# Patient Record
Sex: FEMALE | Race: WHITE | NOT HISPANIC OR LATINO | Employment: UNEMPLOYED | URBAN - METROPOLITAN AREA
[De-identification: names, ages, dates, MRNs, and addresses within clinical notes are randomized per-mention and may not be internally consistent; named-entity substitution may affect disease eponyms.]

---

## 2019-10-15 PROBLEM — E66.811 OBESITY (BMI 30.0-34.9): Status: ACTIVE | Noted: 2019-10-15

## 2020-03-03 PROBLEM — E66.812 CLASS 2 OBESITY DUE TO EXCESS CALORIES WITHOUT SERIOUS COMORBIDITY WITH BODY MASS INDEX (BMI) OF 39.0 TO 39.9 IN ADULT: Status: ACTIVE | Noted: 2019-10-15

## 2021-08-06 DIAGNOSIS — F40.01 PANIC DISORDER WITH AGORAPHOBIA: ICD-10-CM

## 2021-08-06 RX ORDER — LORAZEPAM 0.5 MG/1
0.5 TABLET ORAL EVERY 6 HOURS PRN
Qty: 120 TABLET | Refills: 0 | Status: SHIPPED | OUTPATIENT
Start: 2021-08-06 | End: 2021-09-20 | Stop reason: SDUPTHER

## 2021-08-06 NOTE — TELEPHONE ENCOUNTER
Requested medication(s) are due for refill today: Yes  Patient has already received a courtesy refill: No  Other reason request has been forwarded to provider:

## 2022-05-21 DIAGNOSIS — R00.0 TACHYARRHYTHMIA: ICD-10-CM

## 2022-05-21 DIAGNOSIS — F40.01 PANIC DISORDER WITH AGORAPHOBIA: ICD-10-CM

## 2022-05-21 RX ORDER — LORAZEPAM 0.5 MG/1
0.5 TABLET ORAL EVERY 6 HOURS PRN
Qty: 120 TABLET | Refills: 0 | Status: SHIPPED | OUTPATIENT
Start: 2022-05-21 | End: 2022-06-27 | Stop reason: SDUPTHER

## 2022-05-21 RX ORDER — PROPRANOLOL HYDROCHLORIDE 10 MG/1
20 TABLET ORAL 4 TIMES DAILY
Qty: 240 TABLET | Refills: 1 | Status: SHIPPED | OUTPATIENT
Start: 2022-05-21 | End: 2022-06-15

## 2022-11-25 DIAGNOSIS — F40.01 PANIC DISORDER WITH AGORAPHOBIA: ICD-10-CM

## 2022-11-27 RX ORDER — LORAZEPAM 0.5 MG/1
0.5 TABLET ORAL EVERY 6 HOURS PRN
Qty: 120 TABLET | Refills: 1 | Status: SHIPPED | OUTPATIENT
Start: 2022-11-27

## 2022-12-02 ENCOUNTER — OFFICE VISIT (OUTPATIENT)
Dept: FAMILY MEDICINE CLINIC | Facility: HOSPITAL | Age: 47
End: 2022-12-02

## 2022-12-02 VITALS — HEIGHT: 69 IN | BODY MASS INDEX: 45.37 KG/M2

## 2022-12-02 DIAGNOSIS — C73 PAPILLARY MICROCARCINOMA OF THYROID (HCC): ICD-10-CM

## 2022-12-02 DIAGNOSIS — F42.2 MIXED OBSESSIONAL THOUGHTS AND ACTS: ICD-10-CM

## 2022-12-02 DIAGNOSIS — R60.1 GENERALIZED EDEMA: ICD-10-CM

## 2022-12-02 DIAGNOSIS — E89.0 HYPOTHYROIDISM, POSTOP: ICD-10-CM

## 2022-12-02 DIAGNOSIS — R00.0 TACHYARRHYTHMIA: ICD-10-CM

## 2022-12-02 DIAGNOSIS — F43.22 ADJUSTMENT DISORDER WITH ANXIETY: Primary | ICD-10-CM

## 2022-12-02 DIAGNOSIS — E78.1 HYPERTRIGLYCERIDEMIA: ICD-10-CM

## 2022-12-02 RX ORDER — PAROXETINE 10 MG/1
10 TABLET, FILM COATED ORAL DAILY
Qty: 30 TABLET | Refills: 1 | Status: SHIPPED | OUTPATIENT
Start: 2022-12-02 | End: 2022-12-17

## 2022-12-02 RX ORDER — BUSPIRONE HYDROCHLORIDE 5 MG/1
5 TABLET ORAL 2 TIMES DAILY
Qty: 60 TABLET | Refills: 5 | Status: SHIPPED | OUTPATIENT
Start: 2022-12-02

## 2022-12-02 NOTE — PROGRESS NOTES
Name: Joseph Hernández      : 1975      MRN: [id number]  Encounter Provider: Herlinda Leslie MD  Encounter Date: 2022   Encounter department: [facility]      1  Adjustment disorder with anxiety  -     busPIRone (BUSPAR) 5 mg tablet; Take 1 tablet (5 mg total) by mouth 2 (two) times a day    2  Mixed obsessional thoughts and acts  -     PARoxetine (PAXIL) 10 mg tablet; Take 1 tablet (10 mg total) by mouth daily    3  Hypothyroidism, postop    4  Papillary microcarcinoma of thyroid ([address] )    5  Tachyarrhythmia    6  Generalized edema    7  Hypertriglyceridemia           Subjective       here to discuss medication and anxiety  Pt unable to be present for visit due to severe agoraphobia    Taking Lorazepam in am, and 2 hs for sleep  Also takes Clonazepam in afternoon    Has some days that are reasonably good but today is not a good day    Has exercise machine using every day that does arm action and ski action    Review of Systems    Current Outpatient Medications on File Prior to Visit   Medication Sig   • clonazePAM (KlonoPIN) 1 mg tablet Take 1 tablet (1 mg total) by mouth daily at bedtime   • LORazepam (ATIVAN) 0 5 mg tablet Take 1 tablet (0 5 mg total) by mouth every 6 (six) hours as needed for anxiety   • propranolol (INDERAL) 10 mg tablet TAKE 2 TABS BY MOUTH IN THE MORNING, 2 TABS AT NOON, 2 TABS IN THE EVENING, 2 TABS BEFORE BEDTIME     • Synthroid 100 MCG tablet TAKE 1 TABLET BY MOUTH EVERY DAY   • butalbital-acetaminophen-caffeine (FIORICET,ESGIC) -40 mg per tablet Take 1 tablet by mouth every 4 (four) hours as needed for headaches (Patient not taking: Reported on 2022)   • clomiPRAMINE (ANAFRANIL) 25 mg capsule Take 1 capsule (25 mg total) by mouth daily at bedtime (Patient not taking: Reported on 2022)   • famotidine (PEPCID) 10 mg tablet Take 10 mg by mouth 2 (two) times a day (Patient not taking: Reported on "12/2/2022)   • FLUoxetine (PROzac) 40 MG capsule TAKE 1 CAPSULE BY MOUTH EVERY DAY (Patient not taking: Reported on 12/2/2022)   • furosemide (LASIX) 20 mg tablet Take 1 tablet (20 mg total) by mouth daily (Patient not taking: Reported on 12/2/2022)       Objective     Ht 5' 9\" (1 753 m)   BMI 45 37 kg/m²     Physical Exam  Tate Xavier MD  "

## 2022-12-05 PROBLEM — F40.01 PANIC DISORDER WITH AGORAPHOBIA: Status: ACTIVE | Noted: 2022-12-05

## 2022-12-16 DIAGNOSIS — F43.22 ADJUSTMENT DISORDER WITH ANXIETY: ICD-10-CM

## 2022-12-16 RX ORDER — CLONAZEPAM 1 MG/1
1 TABLET ORAL
Qty: 30 TABLET | Refills: 0 | Status: SHIPPED | OUTPATIENT
Start: 2022-12-16 | End: 2023-01-16 | Stop reason: SDUPTHER

## 2022-12-17 DIAGNOSIS — F42.2 MIXED OBSESSIONAL THOUGHTS AND ACTS: ICD-10-CM

## 2022-12-17 RX ORDER — PAROXETINE 10 MG/1
TABLET, FILM COATED ORAL
Qty: 90 TABLET | Refills: 1 | Status: SHIPPED | OUTPATIENT
Start: 2022-12-17

## 2022-12-19 DIAGNOSIS — R00.0 TACHYARRHYTHMIA: ICD-10-CM

## 2022-12-19 RX ORDER — PROPRANOLOL HYDROCHLORIDE 10 MG/1
TABLET ORAL
Qty: 720 TABLET | Refills: 1 | Status: SHIPPED | OUTPATIENT
Start: 2022-12-19

## 2022-12-20 ENCOUNTER — TELEPHONE (OUTPATIENT)
Dept: FAMILY MEDICINE CLINIC | Facility: HOSPITAL | Age: 47
End: 2022-12-20

## 2022-12-27 DIAGNOSIS — F40.01 PANIC DISORDER WITH AGORAPHOBIA: ICD-10-CM

## 2022-12-27 RX ORDER — LORAZEPAM 0.5 MG/1
0.5 TABLET ORAL EVERY 6 HOURS PRN
Qty: 120 TABLET | Refills: 1 | Status: SHIPPED | OUTPATIENT
Start: 2022-12-27

## 2023-01-06 DIAGNOSIS — E89.0 HYPOTHYROIDISM, POSTOP: ICD-10-CM

## 2023-01-06 RX ORDER — LEVOTHYROXINE SODIUM 100 MCG
TABLET ORAL
Qty: 90 TABLET | Refills: 3 | Status: SHIPPED | OUTPATIENT
Start: 2023-01-06

## 2023-01-16 DIAGNOSIS — F43.22 ADJUSTMENT DISORDER WITH ANXIETY: ICD-10-CM

## 2023-01-17 RX ORDER — CLONAZEPAM 1 MG/1
1 TABLET ORAL
Qty: 30 TABLET | Refills: 0 | Status: SHIPPED | OUTPATIENT
Start: 2023-01-17

## 2023-01-20 ENCOUNTER — TELEPHONE (OUTPATIENT)
Dept: FAMILY MEDICINE CLINIC | Facility: HOSPITAL | Age: 48
End: 2023-01-20

## 2023-01-20 NOTE — TELEPHONE ENCOUNTER
Patient's  called, he dropped some of her Lorazepam and they were destroyed  She is completely out of medication and her  is asking if anything can be done  Pharmacist said it cannot be refilled until 1/25/23  I did reach out to Ibrahima Noriega who is on call, but she said office policy is that controlled substances are not refilled by on call provider over the weekend   notified

## 2023-01-23 ENCOUNTER — TELEPHONE (OUTPATIENT)
Dept: OTHER | Facility: OTHER | Age: 48
End: 2023-01-23

## 2023-01-23 DIAGNOSIS — F40.01 PANIC DISORDER WITH AGORAPHOBIA: ICD-10-CM

## 2023-01-23 RX ORDER — LORAZEPAM 0.5 MG/1
0.5 TABLET ORAL EVERY 6 HOURS PRN
Qty: 120 TABLET | Refills: 1 | Status: SHIPPED | OUTPATIENT
Start: 2023-01-23 | End: 2023-01-24 | Stop reason: SDUPTHER

## 2023-01-23 NOTE — TELEPHONE ENCOUNTER
Dylan Carlton calling again  Pills were accidentally destroyed  She has not had lorazepam for 4 days and is starting to have withdrawal sx  New script for lorazepam sent to you with message to pharm that it is ok to fill early  Pls let loli know if/when this goes to the pharm

## 2023-01-23 NOTE — TELEPHONE ENCOUNTER
called stating he dropped a bunch of her pills in the sink and they got wet  She is now low on meds and needs more sent in  She has a refill on med but pharm will not fill til 1/25/23  Can you approve an early fill?

## 2023-01-24 DIAGNOSIS — F40.01 PANIC DISORDER WITH AGORAPHOBIA: ICD-10-CM

## 2023-01-24 RX ORDER — LORAZEPAM 0.5 MG/1
0.5 TABLET ORAL EVERY 6 HOURS PRN
Qty: 120 TABLET | Refills: 1 | Status: SHIPPED | OUTPATIENT
Start: 2023-01-24

## 2023-02-17 ENCOUNTER — TELEPHONE (OUTPATIENT)
Dept: FAMILY MEDICINE CLINIC | Facility: HOSPITAL | Age: 48
End: 2023-02-17

## 2023-02-17 NOTE — TELEPHONE ENCOUNTER
Sylvester Yu called in for PT - she is helping him with the settlement of his father's estate - and is having a lot of anxiety with this -took a few extra lorazepam - she only has 2 pills left for the month and they can't get it refilled yet  He's asking for an extra 20 pills to hold her over until it time to refill the regular script  Any questions you can call Sylvester Yu

## 2023-02-18 DIAGNOSIS — F40.01 PANIC DISORDER WITH AGORAPHOBIA: ICD-10-CM

## 2023-02-18 RX ORDER — LORAZEPAM 0.5 MG/1
TABLET ORAL
Qty: 150 TABLET | Refills: 0 | Status: SHIPPED | OUTPATIENT
Start: 2023-02-18 | End: 2023-02-21 | Stop reason: SDUPTHER

## 2023-02-20 DIAGNOSIS — F43.22 ADJUSTMENT DISORDER WITH ANXIETY: ICD-10-CM

## 2023-02-20 RX ORDER — CLONAZEPAM 1 MG/1
1 TABLET ORAL
Qty: 30 TABLET | Refills: 0 | Status: SHIPPED | OUTPATIENT
Start: 2023-02-20

## 2023-02-21 ENCOUNTER — TELEPHONE (OUTPATIENT)
Dept: FAMILY MEDICINE CLINIC | Facility: HOSPITAL | Age: 48
End: 2023-02-21

## 2023-02-21 DIAGNOSIS — F42.2 MIXED OBSESSIONAL THOUGHTS AND ACTS: ICD-10-CM

## 2023-02-21 DIAGNOSIS — F40.01 PANIC DISORDER WITH AGORAPHOBIA: ICD-10-CM

## 2023-02-21 RX ORDER — LORAZEPAM 0.5 MG/1
TABLET ORAL
Qty: 120 TABLET | Refills: 0 | Status: SHIPPED | OUTPATIENT
Start: 2023-02-21

## 2023-03-01 DIAGNOSIS — E89.0 HYPOTHYROIDISM, POSTOP: ICD-10-CM

## 2023-03-01 RX ORDER — LEVOTHYROXINE SODIUM 100 MCG
100 TABLET ORAL DAILY
Qty: 90 TABLET | Refills: 0 | Status: SHIPPED | OUTPATIENT
Start: 2023-03-01

## 2023-03-14 DIAGNOSIS — R00.0 TACHYARRHYTHMIA: ICD-10-CM

## 2023-03-16 RX ORDER — PROPRANOLOL HYDROCHLORIDE 10 MG/1
20 TABLET ORAL 4 TIMES DAILY
Qty: 720 TABLET | Refills: 0 | Status: SHIPPED | OUTPATIENT
Start: 2023-03-16

## 2023-03-27 DIAGNOSIS — F43.22 ADJUSTMENT DISORDER WITH ANXIETY: ICD-10-CM

## 2023-03-27 RX ORDER — CLONAZEPAM 1 MG/1
1 TABLET ORAL
Qty: 30 TABLET | Refills: 0 | Status: SHIPPED | OUTPATIENT
Start: 2023-03-27

## 2023-03-29 DIAGNOSIS — E89.0 HYPOTHYROIDISM, POSTOP: ICD-10-CM

## 2023-03-30 RX ORDER — LEVOTHYROXINE SODIUM 100 MCG
TABLET ORAL
Qty: 90 TABLET | Refills: 1 | Status: SHIPPED | OUTPATIENT
Start: 2023-03-30

## 2023-04-24 DIAGNOSIS — F43.22 ADJUSTMENT DISORDER WITH ANXIETY: ICD-10-CM

## 2023-04-24 RX ORDER — CLONAZEPAM 1 MG/1
1 TABLET ORAL
Qty: 30 TABLET | Refills: 0 | Status: SHIPPED | OUTPATIENT
Start: 2023-04-24

## 2023-05-24 DIAGNOSIS — F43.22 ADJUSTMENT DISORDER WITH ANXIETY: ICD-10-CM

## 2023-05-24 RX ORDER — CLONAZEPAM 1 MG/1
1 TABLET ORAL
Qty: 30 TABLET | Refills: 0 | Status: SHIPPED | OUTPATIENT
Start: 2023-05-24

## 2023-06-12 DIAGNOSIS — R00.0 TACHYARRHYTHMIA: ICD-10-CM

## 2023-06-12 RX ORDER — PROPRANOLOL HYDROCHLORIDE 10 MG/1
20 TABLET ORAL 4 TIMES DAILY
Qty: 720 TABLET | Refills: 0 | Status: SHIPPED | OUTPATIENT
Start: 2023-06-12

## 2023-06-27 DIAGNOSIS — F43.22 ADJUSTMENT DISORDER WITH ANXIETY: ICD-10-CM

## 2023-06-27 RX ORDER — CLONAZEPAM 1 MG/1
1 TABLET ORAL
Qty: 30 TABLET | Refills: 0 | Status: SHIPPED | OUTPATIENT
Start: 2023-06-27

## 2023-07-25 DIAGNOSIS — R79.9 ABNORMAL BLOOD CHEMISTRY: ICD-10-CM

## 2023-07-25 DIAGNOSIS — E78.1 HYPERTRIGLYCERIDEMIA: Primary | ICD-10-CM

## 2023-07-25 DIAGNOSIS — E89.0 HYPOTHYROIDISM, POSTOP: ICD-10-CM

## 2023-07-27 DIAGNOSIS — F43.22 ADJUSTMENT DISORDER WITH ANXIETY: ICD-10-CM

## 2023-07-27 RX ORDER — CLONAZEPAM 1 MG/1
1 TABLET ORAL
Qty: 30 TABLET | Refills: 0 | Status: SHIPPED | OUTPATIENT
Start: 2023-07-27

## 2023-08-25 DIAGNOSIS — F43.22 ADJUSTMENT DISORDER WITH ANXIETY: ICD-10-CM

## 2023-08-25 RX ORDER — CLONAZEPAM 1 MG/1
1 TABLET ORAL
Qty: 30 TABLET | Refills: 0 | Status: SHIPPED | OUTPATIENT
Start: 2023-08-25

## 2023-08-29 DIAGNOSIS — R00.0 TACHYARRHYTHMIA: ICD-10-CM

## 2023-08-29 RX ORDER — PROPRANOLOL HYDROCHLORIDE 10 MG/1
20 TABLET ORAL 4 TIMES DAILY
Qty: 720 TABLET | Refills: 0 | Status: SHIPPED | OUTPATIENT
Start: 2023-08-29

## 2023-08-29 NOTE — TELEPHONE ENCOUNTER
Requested medication(s) are due for refill today: Yes  Patient has already received a courtesy refill: No  Other reason request has been forwarded to provider: please advise, pt hasn't been seen in over a year.

## 2023-09-25 DIAGNOSIS — F43.22 ADJUSTMENT DISORDER WITH ANXIETY: ICD-10-CM

## 2023-09-25 DIAGNOSIS — E89.0 HYPOTHYROIDISM, POSTOP: ICD-10-CM

## 2023-09-25 RX ORDER — LEVOTHYROXINE SODIUM 100 MCG
TABLET ORAL
Qty: 30 TABLET | Refills: 5 | Status: SHIPPED | OUTPATIENT
Start: 2023-09-25

## 2023-09-25 RX ORDER — CLONAZEPAM 1 MG/1
1 TABLET ORAL
Qty: 30 TABLET | Refills: 0 | Status: SHIPPED | OUTPATIENT
Start: 2023-09-25

## 2023-10-15 DIAGNOSIS — E89.0 HYPOTHYROIDISM, POSTOP: ICD-10-CM

## 2023-10-16 RX ORDER — LEVOTHYROXINE SODIUM 100 MCG
100 TABLET ORAL DAILY
Qty: 30 TABLET | Refills: 3 | Status: SHIPPED | OUTPATIENT
Start: 2023-10-16

## 2023-10-26 DIAGNOSIS — F43.22 ADJUSTMENT DISORDER WITH ANXIETY: ICD-10-CM

## 2023-10-26 RX ORDER — CLONAZEPAM 1 MG/1
1 TABLET ORAL
Qty: 30 TABLET | Refills: 0 | Status: SHIPPED | OUTPATIENT
Start: 2023-10-26

## 2023-11-09 DIAGNOSIS — R00.0 TACHYARRHYTHMIA: ICD-10-CM

## 2023-11-09 DIAGNOSIS — E89.0 HYPOTHYROIDISM, POSTOP: ICD-10-CM

## 2023-11-09 RX ORDER — PROPRANOLOL HYDROCHLORIDE 10 MG/1
20 TABLET ORAL 4 TIMES DAILY
Qty: 720 TABLET | Refills: 0 | Status: SHIPPED | OUTPATIENT
Start: 2023-11-09 | End: 2023-11-09 | Stop reason: SDUPTHER

## 2023-11-09 RX ORDER — PROPRANOLOL HYDROCHLORIDE 10 MG/1
20 TABLET ORAL 4 TIMES DAILY
Qty: 128 TABLET | Refills: 0 | Status: SHIPPED | OUTPATIENT
Start: 2023-11-09

## 2023-11-09 RX ORDER — LEVOTHYROXINE SODIUM 100 MCG
100 TABLET ORAL DAILY
Qty: 30 TABLET | Refills: 0 | Status: SHIPPED | OUTPATIENT
Start: 2023-11-09

## 2023-11-24 DIAGNOSIS — R00.0 TACHYARRHYTHMIA: ICD-10-CM

## 2023-11-24 DIAGNOSIS — F43.22 ADJUSTMENT DISORDER WITH ANXIETY: ICD-10-CM

## 2023-11-24 RX ORDER — PROPRANOLOL HYDROCHLORIDE 10 MG/1
20 TABLET ORAL 4 TIMES DAILY
Qty: 128 TABLET | Refills: 0 | Status: SHIPPED | OUTPATIENT
Start: 2023-11-24

## 2023-11-24 RX ORDER — CLONAZEPAM 1 MG/1
1 TABLET ORAL
Qty: 30 TABLET | Refills: 0 | Status: SHIPPED | OUTPATIENT
Start: 2023-11-24

## 2023-12-14 DIAGNOSIS — R00.0 TACHYARRHYTHMIA: ICD-10-CM

## 2023-12-15 RX ORDER — PROPRANOLOL HYDROCHLORIDE 10 MG/1
20 TABLET ORAL 4 TIMES DAILY
Qty: 128 TABLET | Refills: 3 | Status: SHIPPED | OUTPATIENT
Start: 2023-12-15

## 2023-12-22 ENCOUNTER — TELEPHONE (OUTPATIENT)
Dept: FAMILY MEDICINE CLINIC | Facility: HOSPITAL | Age: 48
End: 2023-12-22

## 2023-12-22 DIAGNOSIS — E89.0 HYPOTHYROIDISM, POSTOP: ICD-10-CM

## 2023-12-22 RX ORDER — LEVOTHYROXINE SODIUM 100 MCG
100 TABLET ORAL DAILY
Qty: 30 TABLET | Refills: 3 | Status: SHIPPED | OUTPATIENT
Start: 2023-12-22

## 2023-12-22 NOTE — TELEPHONE ENCOUNTER
Pt's spouse reports that rx for Propanolol was sent for only 128 tabs. States she will run out before the month and should be double. Asking if this can be resent. PCB spouse with update.

## 2023-12-26 DIAGNOSIS — R00.0 TACHYARRHYTHMIA: ICD-10-CM

## 2023-12-26 DIAGNOSIS — F43.22 ADJUSTMENT DISORDER WITH ANXIETY: ICD-10-CM

## 2023-12-26 RX ORDER — CLONAZEPAM 1 MG/1
1 TABLET ORAL
Qty: 30 TABLET | Refills: 0 | Status: SHIPPED | OUTPATIENT
Start: 2023-12-26

## 2023-12-26 RX ORDER — PROPRANOLOL HYDROCHLORIDE 10 MG/1
20 TABLET ORAL 4 TIMES DAILY
Qty: 240 TABLET | Refills: 3 | Status: SHIPPED | OUTPATIENT
Start: 2023-12-26

## 2024-01-15 DIAGNOSIS — E89.0 HYPOTHYROIDISM, POSTOP: ICD-10-CM

## 2024-01-15 RX ORDER — LEVOTHYROXINE SODIUM 100 MCG
100 TABLET ORAL DAILY
Qty: 90 TABLET | Refills: 2 | Status: SHIPPED | OUTPATIENT
Start: 2024-01-15

## 2024-01-25 DIAGNOSIS — F43.22 ADJUSTMENT DISORDER WITH ANXIETY: ICD-10-CM

## 2024-01-26 RX ORDER — CLONAZEPAM 1 MG/1
1 TABLET ORAL
Qty: 30 TABLET | Refills: 3 | Status: SHIPPED | OUTPATIENT
Start: 2024-01-26

## 2024-03-14 NOTE — PROGRESS NOTES
"Patient ID: Dionne Ford is a 48 y.o. female Date of Birth 1975       Chief Complaint   Patient presents with    Ingrown Toenail     2 ingrown toenails both great toe                Diagnosis:  1. Ingrown nail of great toe of left foot  -     lidocaine (XYLOCAINE) 1 % injection 3 mL    2. Ingrown right greater toenail      Initial pedal evaluation with socks and shoes removed bilaterally.  Today we discuss the etiology and treatment options of ingrown toe nails.  As this is a recurrent chronic problem, toenails not infected, left is more painful than right, phenol and alcohol matricectomy was performed on left great toe medial nail border, please see procedure note.  Patient was given postoperative care instructions in writing.  Patient will follow-up in 2 weeks for check of left great toe and right great toe permanent procedure.      Nail removal    Date/Time: 3/15/2024 11:30 AM    Performed by: Priya Bradford DPM  Authorized by: Priya Bradford DPM    Patient location:  ClinicUniversal Protocol:  Consent: Verbal consent obtained.  Risks and benefits: risks, benefits and alternatives were discussed  Consent given by: patient  Time out: Immediately prior to procedure a \"time out\" was called to verify the correct patient, procedure, equipment, support staff and site/side marked as required.  Patient understanding: patient states understanding of the procedure being performed  Patient identity confirmed: verbally with patient    Location:     Foot:  L big toe  Pre-procedure details:     Preparation: Patient was prepped and draped in the usual sterile fashion    Anesthesia (see MAR for exact dosages):     Anesthesia method:  Local infiltration    Local anesthetic:  Lidocaine 1% w/o epi  Nail Removal:     Nail removed:  Partial    Nail side:  Medial    Nail bed sutured: no    Ingrown nail:     Wedge excision of skin: no      Nail matrix removed or ablated:  Partial  Post-procedure details:     Dressing:  " 4x4 sterile gauze, antibiotic ointment and gauze roll    Patient tolerance of procedure:  Tolerated well, no immediate complications       Subjective:   Dionne presents today for evaluation care of ingrown toenails, she states the last few years her toenails have become quite curved, left grows with a hook on the inside border and now the right is starting.  She notes her daughters had permanent ingrown toenail procedures and she would like to have the same done.        The following portions of the patient's history were reviewed and updated as appropriate:     Past Medical History:   Diagnosis Date    Anemia     Anxiety     Asthma     mild     Cancer (HCC) 2019    Thyroid    Disease of thyroid gland 2019    Removed    GERD (gastroesophageal reflux disease)     Ingrown toenail 2024    Painful    Palpitations        Past Surgical History:   Procedure Laterality Date    APPENDECTOMY      AUGMENTATION MAMMAPLASTY       SECTION      DENTAL SURGERY      THYROIDECTOMY Bilateral 2019    Procedure: TOTAL THYROIDECTOMY;  Surgeon: Greg Corona MD;  Location: [facility];  Service: Surgical Oncology    US GUIDED THYROID BIOPSY  10/8/2019       Social History     Socioeconomic History    Marital status: /Civil Union     Spouse name: None    Number of children: None    Years of education: None    Highest education level: None   Occupational History    None   Tobacco Use    Smoking status: Every Day     Current packs/day: 1.50     Average packs/day: 1.5 packs/day for 20.0 years (30.0 ttl pk-yrs)     Types: Cigarettes    Smokeless tobacco: Never   Vaping Use    Vaping status: Never Used   Substance and Sexual Activity    Alcohol use: Not Currently     Comment: Social    Drug use: No    Sexual activity: Yes     Partners: Male     Birth control/protection: Surgical   Other Topics Concern    None   Social History Narrative    None     Social Determinants of Health     Financial Resource Strain: Not on file    Food Insecurity: Not on file   Transportation Needs: Not on file   Physical Activity: Not on file   Stress: Not on file   Social Connections: Not on file   Intimate Partner Violence: Not on file   Housing Stability: Not on file          Current Outpatient Medications:     busPIRone (BUSPAR) 5 mg tablet, Take 1 tablet (5 mg total) by mouth 2 (two) times a day, Disp: 60 tablet, Rfl: 5    clomiPRAMINE (ANAFRANIL) 25 mg capsule, Take 1 capsule (25 mg total) by mouth daily at bedtime, Disp: 30 capsule, Rfl: 0    clonazePAM (KlonoPIN) 1 mg tablet, Take 1 tablet (1 mg total) by mouth daily at bedtime, Disp: 30 tablet, Rfl: 3    LORazepam (ATIVAN) 0.5 mg tablet, Take 1 or 2 tablets every 6 hours as needed for anxiety, Disp: 120 tablet, Rfl: 0    PARoxetine (PAXIL) 10 mg tablet, TAKE 1 TABLET BY MOUTH EVERY DAY, Disp: 90 tablet, Rfl: 1    propranolol (INDERAL) 10 mg tablet, Take 2 tablets (20 mg total) by mouth 4 (four) times a day, Disp: 240 tablet, Rfl: 3    Synthroid 100 MCG tablet, TAKE 1 TABLET BY MOUTH EVERY DAY, Disp: 90 tablet, Rfl: 2    butalbital-acetaminophen-caffeine (FIORICET,ESGIC) -40 mg per tablet, Take 1 tablet by mouth every 4 (four) hours as needed for headaches (Patient not taking: Reported on 12/2/2022), Disp: 30 tablet, Rfl: 0    famotidine (PEPCID) 10 mg tablet, Take 10 mg by mouth 2 (two) times a day (Patient not taking: Reported on 12/2/2022), Disp: , Rfl:     FLUoxetine (PROzac) 40 MG capsule, TAKE 1 CAPSULE BY MOUTH EVERY DAY (Patient not taking: Reported on 12/2/2022), Disp: 90 capsule, Rfl: 1    furosemide (LASIX) 20 mg tablet, Take 1 tablet (20 mg total) by mouth daily (Patient not taking: Reported on 12/2/2022), Disp: 30 tablet, Rfl: 5  No current facility-administered medications for this visit.    Allergies  Mirtazapine and Hydrocodone-acetaminophen    Family History   Problem Relation Age of Onset    Heart disease Mother     Hypertension Mother     Heart disease Paternal Grandfather   "          Objective:  /69 (BP Location: Left arm, Patient Position: Sitting, Cuff Size: Large)   Pulse 90   Ht 5' 9\" (1.753 m) Comment: stated  Wt 125 kg (276 lb 9.6 oz)   BMI 40.85 kg/m²     Review of Systems   Constitutional:  Negative for chills and fever.   HENT:  Negative for ear pain and sore throat.    Eyes:  Negative for pain and visual disturbance.   Respiratory:  Negative for cough and shortness of breath.    Cardiovascular:  Negative for chest pain and palpitations.   Gastrointestinal:  Negative for abdominal pain and vomiting.   Genitourinary:  Negative for dysuria and hematuria.   Musculoskeletal:  Negative for arthralgias and back pain.   Skin:  Negative for color change and rash.        Ingrown toe nails   Neurological:  Negative for seizures and syncope.   All other systems reviewed and are negative.      Physical Exam  Constitutional:       Appearance: Normal appearance. She is well-developed. She is obese.   HENT:      Head: Normocephalic and atraumatic.      Nose: Nose normal.      Mouth/Throat:      Mouth: Mucous membranes are moist.      Pharynx: Oropharynx is clear.   Eyes:      Conjunctiva/sclera: Conjunctivae normal.      Pupils: Pupils are equal, round, and reactive to light.   Cardiovascular:      Pulses:           Dorsalis pedis pulses are 2+ on the right side and 2+ on the left side.        Posterior tibial pulses are 2+ on the right side and 2+ on the left side.   Pulmonary:      Effort: Pulmonary effort is normal.   Musculoskeletal:         General: Normal range of motion.      Cervical back: Normal range of motion.      Right lower leg: No edema.      Left lower leg: No edema.   Feet:      Right foot:      Protective Sensation: 10 sites tested.  10 sites sensed.      Skin integrity: Skin integrity normal.      Toenail Condition: Right toenails are ingrown.      Left foot:      Protective Sensation: 10 sites tested.  10 sites sensed.      Skin integrity: Skin integrity normal. " "     Toenail Condition: Left toenails are ingrown.      Comments: Bilateral great toes medial nail border are incurvated, pincer type nail, pain on palpation, no signs of infection are noted, remainder of toenails are in good repair.  Skin:     General: Skin is warm and dry.      Capillary Refill: Capillary refill takes less than 2 seconds.   Neurological:      General: No focal deficit present.      Mental Status: She is alert and oriented to person, place, and time. Mental status is at baseline.   Psychiatric:         Mood and Affect: Mood normal.         Behavior: Behavior normal.         Thought Content: Thought content normal.         Judgment: Judgment normal.                  No pertinent results found.      Priya Bradford, CHAYM, DPM, FACFAS    Portions of the record may have been created with voice recognition software. Occasional wrong word or \"sound a like\" substitutions may have occurred due to the inherent limitations of voice recognition software. Read the chart carefully and recognize, using context, where substitutions have occurred.  "

## 2024-03-15 ENCOUNTER — OFFICE VISIT (OUTPATIENT)
Dept: PODIATRY | Facility: CLINIC | Age: 49
End: 2024-03-15
Payer: COMMERCIAL

## 2024-03-15 VITALS
BODY MASS INDEX: 40.97 KG/M2 | SYSTOLIC BLOOD PRESSURE: 112 MMHG | HEIGHT: 69 IN | HEART RATE: 90 BPM | WEIGHT: 276.6 LBS | DIASTOLIC BLOOD PRESSURE: 69 MMHG

## 2024-03-15 DIAGNOSIS — L60.0 INGROWN NAIL OF GREAT TOE OF LEFT FOOT: Primary | ICD-10-CM

## 2024-03-15 DIAGNOSIS — L60.0 INGROWN RIGHT GREATER TOENAIL: ICD-10-CM

## 2024-03-15 PROCEDURE — 99203 OFFICE O/P NEW LOW 30 MIN: CPT | Performed by: PODIATRIST

## 2024-03-15 PROCEDURE — 11750 EXCISION NAIL&NAIL MATRIX: CPT | Performed by: PODIATRIST

## 2024-03-15 RX ORDER — LIDOCAINE HYDROCHLORIDE 10 MG/ML
3 INJECTION, SOLUTION INFILTRATION; PERINEURAL ONCE
Status: COMPLETED | OUTPATIENT
Start: 2024-03-15 | End: 2024-03-15

## 2024-03-15 RX ADMIN — LIDOCAINE HYDROCHLORIDE 3 ML: 10 INJECTION, SOLUTION INFILTRATION; PERINEURAL at 12:02

## 2024-03-25 DIAGNOSIS — R00.0 TACHYARRHYTHMIA: ICD-10-CM

## 2024-03-25 RX ORDER — PROPRANOLOL HYDROCHLORIDE 10 MG/1
TABLET ORAL
Qty: 720 TABLET | Refills: 1 | Status: SHIPPED | OUTPATIENT
Start: 2024-03-25

## 2024-03-28 NOTE — PROGRESS NOTES
"Patient ID: Dionne Ford is a 48 y.o. female Date of Birth 1975       Chief Complaint   Patient presents with    Ingrown Toenail     Left ingrown toe nail 2 weeks f/u             Diagnosis:  1. Ingrown nail of great toe of left foot    2. Ingrown right greater toenail  -     lidocaine (XYLOCAINE) 1 % injection 3 mL  -     Nail removal      Bilateral pedal examination with socks and shoes removed.  At this time as left great toe medial nail border is well-healed, she may discontinue all soaks and dressings.  Phenol and alcohol matricectomy was performed on right great toe medial and lateral nail border, please see procedure note.  Written postoperative care instructions were given to patient.  Patient understands and agrees with plan and will follow-up in 3 weeks.      Nail removal    Date/Time: 3/29/2024 1:15 PM    Performed by: Priya Bradford DPM  Authorized by: Priya Bradford DPM    Patient location:  ClinicUniversal Protocol:  Consent: Verbal consent obtained.  Risks and benefits: risks, benefits and alternatives were discussed  Consent given by: patient  Time out: Immediately prior to procedure a \"time out\" was called to verify the correct patient, procedure, equipment, support staff and site/side marked as required.  Patient understanding: patient states understanding of the procedure being performed  Patient identity confirmed: verbally with patient    Location:     Foot:  R big toe  Pre-procedure details:     Skin preparation:  Alcohol    Preparation: Patient was prepped and draped in the usual sterile fashion    Anesthesia (see MAR for exact dosages):     Anesthesia method:  Local infiltration    Local anesthetic:  Lidocaine 1% w/o epi  Nail Removal:     Nail removed:  Partial    Nail side:  Medial (medial and lateral)    Nail bed sutured: no    Ingrown nail:     Wedge excision of skin: no      Nail matrix removed or ablated:  Partial  Post-procedure details:     Dressing:  4x4 sterile " "gauze, antibiotic ointment and gauze roll    Patient tolerance of procedure:  Tolerated well, no immediate complications       Subjective:   Dionne presents today for follow-up of phenol and alcohol matricectomy left great toe medial nail border, she states that what is feeling great and is looking forward to having the right done today.  No new complaints.          The following portions of the patient's history were reviewed and updated as appropriate: allergies, current medications, past family history, past medical history, past social history, past surgical history, and problem list.        Objective:  /78 (BP Location: Left arm, Patient Position: Sitting, Cuff Size: Adult)   Pulse 84   Ht 5' 9\" (1.753 m)   Wt 125 kg (276 lb)   BMI 40.76 kg/m²     Review of Systems   Constitutional:  Negative for chills and fever.   HENT:  Negative for ear pain and sore throat.    Eyes:  Negative for pain and visual disturbance.   Respiratory:  Negative for cough and shortness of breath.    Cardiovascular:  Negative for chest pain and palpitations.   Gastrointestinal:  Negative for abdominal pain and vomiting.   Genitourinary:  Negative for dysuria and hematuria.   Musculoskeletal:  Negative for arthralgias and back pain.   Skin:  Negative for color change and rash.        Ingrown toenails   Neurological:  Negative for seizures and syncope.   All other systems reviewed and are negative.      Physical Exam  Constitutional:       Appearance: Normal appearance. She is well-developed and normal weight.   HENT:      Head: Normocephalic and atraumatic.      Nose: Nose normal.      Mouth/Throat:      Mouth: Mucous membranes are moist.      Pharynx: Oropharynx is clear.   Eyes:      Conjunctiva/sclera: Conjunctivae normal.      Pupils: Pupils are equal, round, and reactive to light.   Cardiovascular:      Pulses:           Dorsalis pedis pulses are 2+ on the right side and 2+ on the left side.        Posterior tibial pulses " "are 2+ on the right side and 2+ on the left side.   Pulmonary:      Effort: Pulmonary effort is normal.   Musculoskeletal:         General: Normal range of motion.      Cervical back: Normal range of motion.      Right lower leg: No edema.      Left lower leg: No edema.   Feet:      Right foot:      Protective Sensation: 10 sites tested.  10 sites sensed.      Skin integrity: Skin integrity normal.      Toenail Condition: Right toenails are ingrown.      Left foot:      Protective Sensation: 10 sites tested.  10 sites sensed.      Skin integrity: Skin integrity normal.      Toenail Condition: Left toenails are normal.      Comments: Left great toe medial and lateral  nail border is well-healed, no signs of nail spicule, no pain on palpation or infection.  Right great toe medial nail border is incurvated, pain on palpation, no signs of infection noted.  Remainder of toenails are in good repair.  Skin:     General: Skin is warm and dry.      Capillary Refill: Capillary refill takes less than 2 seconds.   Neurological:      General: No focal deficit present.      Mental Status: She is alert and oriented to person, place, and time. Mental status is at baseline.   Psychiatric:         Mood and Affect: Mood normal.         Behavior: Behavior normal.         Thought Content: Thought content normal.         Judgment: Judgment normal.              No pertinent results found.      Priya Bradford, SONAM, DPM, FACFAS    Portions of the record may have been created with voice recognition software. Occasional wrong word or \"sound a like\" substitutions may have occurred due to the inherent limitations of voice recognition software. Read the chart carefully and recognize, using context, where substitutions have occurred.  "

## 2024-03-29 ENCOUNTER — OFFICE VISIT (OUTPATIENT)
Dept: PODIATRY | Facility: CLINIC | Age: 49
End: 2024-03-29
Payer: COMMERCIAL

## 2024-03-29 VITALS
HEIGHT: 69 IN | BODY MASS INDEX: 40.88 KG/M2 | WEIGHT: 276 LBS | HEART RATE: 84 BPM | SYSTOLIC BLOOD PRESSURE: 119 MMHG | DIASTOLIC BLOOD PRESSURE: 78 MMHG

## 2024-03-29 DIAGNOSIS — L60.0 INGROWN NAIL OF GREAT TOE OF LEFT FOOT: Primary | ICD-10-CM

## 2024-03-29 DIAGNOSIS — L60.0 INGROWN RIGHT GREATER TOENAIL: ICD-10-CM

## 2024-03-29 PROCEDURE — 11750 EXCISION NAIL&NAIL MATRIX: CPT | Performed by: PODIATRIST

## 2024-03-29 RX ORDER — LIDOCAINE HYDROCHLORIDE 10 MG/ML
3 INJECTION, SOLUTION INFILTRATION; PERINEURAL ONCE
Status: COMPLETED | OUTPATIENT
Start: 2024-03-29 | End: 2024-03-29

## 2024-03-29 RX ADMIN — LIDOCAINE HYDROCHLORIDE 3 ML: 10 INJECTION, SOLUTION INFILTRATION; PERINEURAL at 13:49

## 2024-04-04 NOTE — PROGRESS NOTES
"Patient ID: Dionne Ford is a 48 y.o. female Date of Birth 1975       Chief Complaint   Patient presents with    Ingrown Toenail     Follow up             Diagnosis:  1. Ingrown right greater toenail      Bilateral pedal examination with socks and shoes removed.  At this time as right great toe bilateral nail borders are well-healed, she may discontinue all soaks and dressings, left continues to be well-healed.  Patient is advised and educated on how to cut toenail straight across, do not cut down into the corners, do not cut and peel, do not cut too short.  Patient understands and agrees with the plan and will follow-up as needed.        Subjective:   Dionne presents today for follow-up phenol and alcohol matricectomy right great toe bilateral borders on 3/29/2024, she states she is doing well and not having any issues.  No new complaints.          The following portions of the patient's history were reviewed and updated as appropriate: allergies, current medications, past family history, past medical history, past social history, past surgical history, and problem list.        Objective:  /73 (BP Location: Right arm, Patient Position: Sitting, Cuff Size: Adult)   Ht 5' 9\" (1.753 m)   Wt 125 kg (276 lb)   BMI 40.76 kg/m²     Review of Systems   Constitutional:  Negative for chills and fever.   HENT:  Negative for ear pain and sore throat.    Eyes:  Negative for pain and visual disturbance.   Respiratory:  Negative for cough and shortness of breath.    Cardiovascular:  Negative for chest pain and palpitations.   Gastrointestinal:  Negative for abdominal pain and vomiting.   Genitourinary:  Negative for dysuria and hematuria.   Musculoskeletal:  Negative for arthralgias and back pain.   Skin:  Negative for color change and rash.        Ingrown toenail right great toe   Neurological:  Negative for seizures and syncope.   All other systems reviewed and are negative.      Physical " "Exam  Constitutional:       Appearance: Normal appearance. She is well-developed. She is obese.   HENT:      Head: Normocephalic and atraumatic.      Nose: Nose normal.      Mouth/Throat:      Mouth: Mucous membranes are moist.      Pharynx: Oropharynx is clear.   Eyes:      Conjunctiva/sclera: Conjunctivae normal.      Pupils: Pupils are equal, round, and reactive to light.   Cardiovascular:      Pulses:           Dorsalis pedis pulses are 2+ on the right side and 2+ on the left side.        Posterior tibial pulses are 2+ on the right side and 2+ on the left side.   Pulmonary:      Effort: Pulmonary effort is normal.   Musculoskeletal:         General: Normal range of motion.      Cervical back: Normal range of motion.      Right lower leg: No edema.      Left lower leg: No edema.   Feet:      Right foot:      Protective Sensation: 10 sites tested.  10 sites sensed.      Skin integrity: Skin integrity normal.      Toenail Condition: Right toenails are normal.      Left foot:      Protective Sensation: 10 sites tested.  10 sites sensed.      Skin integrity: Skin integrity normal.      Toenail Condition: Left toenails are normal.      Comments: Right great toe bilateral nail borders are well-healed, no signs of infection, nail spicule, no pain on palpation, no edema, erythema, drainage.  Left great toe continues to be well-healed.  Remainder of toenails are in good repair.    Skin:     General: Skin is warm and dry.   Neurological:      Mental Status: She is alert and oriented to person, place, and time.   Psychiatric:         Behavior: Behavior normal.         Thought Content: Thought content normal.         Judgment: Judgment normal.                No pertinent results found.      Priya Bradford, SONAM, DPM, FACFAS    Portions of the record may have been created with voice recognition software. Occasional wrong word or \"sound a like\" substitutions may have occurred due to the inherent limitations of voice recognition " software. Read the chart carefully and recognize, using context, where substitutions have occurred.

## 2024-04-05 ENCOUNTER — OFFICE VISIT (OUTPATIENT)
Dept: PODIATRY | Facility: CLINIC | Age: 49
End: 2024-04-05

## 2024-04-05 VITALS
SYSTOLIC BLOOD PRESSURE: 107 MMHG | DIASTOLIC BLOOD PRESSURE: 73 MMHG | WEIGHT: 276 LBS | HEIGHT: 69 IN | BODY MASS INDEX: 40.88 KG/M2

## 2024-04-05 DIAGNOSIS — L60.0 INGROWN RIGHT GREATER TOENAIL: Primary | ICD-10-CM

## 2024-04-05 PROCEDURE — 99024 POSTOP FOLLOW-UP VISIT: CPT | Performed by: PODIATRIST

## 2024-05-03 ENCOUNTER — TELEPHONE (OUTPATIENT)
Age: 49
End: 2024-05-03

## 2024-05-03 DIAGNOSIS — L56.8 PHOTODERMATITIS DUE TO SUN: Primary | ICD-10-CM

## 2024-05-03 RX ORDER — PREDNISONE 10 MG/1
TABLET ORAL
Qty: 16 TABLET | Refills: 0 | Status: SHIPPED | OUTPATIENT
Start: 2024-05-03

## 2024-05-03 NOTE — TELEPHONE ENCOUNTER
Patient has rash,  believes it is from sun exposure. Is this something you are able to treat? Please advise. TY

## 2024-05-03 NOTE — TELEPHONE ENCOUNTER
PT spouse call in regards to allergic reaction to the Sun and wife is breaking out all over her arm and back.  PT spouse inquired about getting her medication sent to her pharmacy for the reaction to the sun. Thank you

## 2024-05-26 ENCOUNTER — NURSE TRIAGE (OUTPATIENT)
Dept: OTHER | Facility: OTHER | Age: 49
End: 2024-05-26

## 2024-05-26 NOTE — TELEPHONE ENCOUNTER
"Regarding: Needs advise for Anxiety and Medication  ----- Message from Zaynab BARNEY sent at 5/26/2024  3:21 PM EDT -----  \" My Wife takes Clonazepam for High Anxiety, It gets worse at night, she took her last dose last night, Dr. Bridges told us she needs to take this at night. She really needs this to help her go to sleep. She will not settle herself enough without it. She is out and I don't know what to do. I need advise.\"    "

## 2024-06-24 DIAGNOSIS — F43.22 ADJUSTMENT DISORDER WITH ANXIETY: ICD-10-CM

## 2024-06-24 RX ORDER — CLONAZEPAM 1 MG/1
1 TABLET ORAL
Qty: 30 TABLET | Refills: 0 | Status: SHIPPED | OUTPATIENT
Start: 2024-06-24

## 2024-07-22 DIAGNOSIS — F43.22 ADJUSTMENT DISORDER WITH ANXIETY: ICD-10-CM

## 2024-07-23 RX ORDER — CLONAZEPAM 1 MG/1
1 TABLET ORAL
Qty: 30 TABLET | Refills: 0 | Status: SHIPPED | OUTPATIENT
Start: 2024-07-23

## 2024-07-23 NOTE — TELEPHONE ENCOUNTER
Requested medication(s) are due for refill today: Yes  Patient has already received a courtesy refill: No  Other reason request has been forwarded to provider: please advise, pt hasn't been seen a while.

## 2024-08-21 DIAGNOSIS — R00.0 TACHYARRHYTHMIA: ICD-10-CM

## 2024-08-21 DIAGNOSIS — F43.22 ADJUSTMENT DISORDER WITH ANXIETY: ICD-10-CM

## 2024-08-21 RX ORDER — CLONAZEPAM 1 MG/1
1 TABLET ORAL
Qty: 30 TABLET | Refills: 2 | Status: SHIPPED | OUTPATIENT
Start: 2024-08-21

## 2024-08-21 RX ORDER — PROPRANOLOL HYDROCHLORIDE 10 MG/1
20 TABLET ORAL 4 TIMES DAILY
Qty: 720 TABLET | Refills: 1 | Status: SHIPPED | OUTPATIENT
Start: 2024-08-21

## 2024-09-18 ENCOUNTER — TELEPHONE (OUTPATIENT)
Dept: FAMILY MEDICINE CLINIC | Facility: HOSPITAL | Age: 49
End: 2024-09-18

## 2024-09-20 DIAGNOSIS — E89.0 HYPOTHYROIDISM, POSTOP: ICD-10-CM

## 2024-09-20 DIAGNOSIS — R79.9 ABNORMAL BLOOD CHEMISTRY: Primary | ICD-10-CM

## 2024-09-20 NOTE — TELEPHONE ENCOUNTER
Pt's  called checking on status of request for labs; stated he finally got pt to agree to complete them and is afraid she'll change her mind if not done asap.    Please contact to advise once orders placed; per John ok to leave message.

## 2024-10-21 DIAGNOSIS — E89.0 HYPOTHYROIDISM, POSTOP: ICD-10-CM

## 2024-10-21 RX ORDER — LEVOTHYROXINE SODIUM 100 MCG
100 TABLET ORAL DAILY
Qty: 30 TABLET | Refills: 0 | Status: SHIPPED | OUTPATIENT
Start: 2024-10-21

## 2024-10-21 NOTE — TELEPHONE ENCOUNTER
Please notify pt that her thyroid meds were approved for 30 days but no further meds will be given until she does thyroid labs and is seen in the office by any provider to establish care

## 2024-10-21 NOTE — TELEPHONE ENCOUNTER
Medication: Synthroid     Dose/Frequency: 100mcg    Quantity: 90    Pharmacy: [facility]     Office:   [x] PCP/Provider -   [] Speciality/Provider -     Does the patient have enough for 3 days?   [] Yes   [x] No - Send as HP to POD

## 2024-11-06 DIAGNOSIS — E89.0 HYPOTHYROIDISM, POSTOP: ICD-10-CM

## 2024-11-07 RX ORDER — LEVOTHYROXINE SODIUM 100 MCG
100 TABLET ORAL DAILY
Qty: 30 TABLET | Refills: 0 | Status: SHIPPED | OUTPATIENT
Start: 2024-11-07

## 2024-11-07 NOTE — TELEPHONE ENCOUNTER
FINAL NOTICE: Please notify patient that their medication was approved but no refills were given. She is overdue for follow up appt and thyroid labs.  She needs to make an appt AND HAVE LABS DONE to con't receiving medications.  TY

## 2024-11-18 ENCOUNTER — PATIENT OUTREACH (OUTPATIENT)
Dept: HEMATOLOGY ONCOLOGY | Facility: CLINIC | Age: 49
End: 2024-11-18

## 2024-11-18 NOTE — PROGRESS NOTES
Outreach to pt to follow-up on self-referral to Dr. Corona.  Introdiced myself and informed pt that I checked with the office and Dr. Corona would not be the one to proescribe or manage her thyroid medication and she would need to follow-up with either the PCP or Endocrine.  Pt verbalized understanding and stated she was also wanting to see him because she never followed up with testing ordered after surgery due to COVID.  Advised the access center will reach out to her to schedule

## 2024-11-19 ENCOUNTER — TELEPHONE (OUTPATIENT)
Age: 49
End: 2024-11-19

## 2024-11-19 NOTE — TELEPHONE ENCOUNTER
Patient called stating that after the last voicemail that was left for her she will never be coming back to the practice. Dr. Bridges was her PCP and does not want to see anyone else.    Thanks

## 2024-11-23 LAB
BASOPHILS # BLD AUTO: 0.1 X10E3/UL (ref 0–0.2)
BASOPHILS NFR BLD AUTO: 1 %
EOSINOPHIL # BLD AUTO: 0.8 X10E3/UL (ref 0–0.4)
EOSINOPHIL NFR BLD AUTO: 6 %
ERYTHROCYTE [DISTWIDTH] IN BLOOD BY AUTOMATED COUNT: 20 % (ref 11.7–15.4)
EST. AVERAGE GLUCOSE BLD GHB EST-MCNC: 108 MG/DL
HBA1C MFR BLD: 5.4 % (ref 4.8–5.6)
HCT VFR BLD AUTO: 30.5 % (ref 34–46.6)
HGB BLD-MCNC: 7.9 G/DL (ref 11.1–15.9)
IMM GRANULOCYTES # BLD: 0.1 X10E3/UL (ref 0–0.1)
IMM GRANULOCYTES NFR BLD: 1 %
LYMPHOCYTES # BLD AUTO: 2.2 X10E3/UL (ref 0.7–3.1)
LYMPHOCYTES NFR BLD AUTO: 18 %
MCH RBC QN AUTO: 17.8 PG (ref 26.6–33)
MCHC RBC AUTO-ENTMCNC: 25.9 G/DL (ref 31.5–35.7)
MCV RBC AUTO: 69 FL (ref 79–97)
MONOCYTES # BLD AUTO: 0.9 X10E3/UL (ref 0.1–0.9)
MONOCYTES NFR BLD AUTO: 7 %
NEUTROPHILS # BLD AUTO: 8.4 X10E3/UL (ref 1.4–7)
NEUTROPHILS NFR BLD AUTO: 67 %
PLATELET # BLD AUTO: 351 X10E3/UL (ref 150–450)
RBC # BLD AUTO: 4.43 X10E6/UL (ref 3.77–5.28)
SL AMB T4, FREE (DIRECT): 1.24 NG/DL (ref 0.82–1.77)
TSH SERPL DL<=0.005 MIU/L-ACNC: 5.11 UIU/ML (ref 0.45–4.5)
WBC # BLD AUTO: 12.5 X10E3/UL (ref 3.4–10.8)

## 2024-11-25 ENCOUNTER — OFFICE VISIT (OUTPATIENT)
Dept: ENDOCRINOLOGY | Facility: CLINIC | Age: 49
End: 2024-11-25
Payer: COMMERCIAL

## 2024-11-25 ENCOUNTER — RESULTS FOLLOW-UP (OUTPATIENT)
Dept: FAMILY MEDICINE CLINIC | Facility: HOSPITAL | Age: 49
End: 2024-11-25

## 2024-11-25 VITALS
DIASTOLIC BLOOD PRESSURE: 70 MMHG | HEART RATE: 83 BPM | HEIGHT: 69 IN | OXYGEN SATURATION: 99 % | SYSTOLIC BLOOD PRESSURE: 110 MMHG | WEIGHT: 263 LBS | BODY MASS INDEX: 38.95 KG/M2

## 2024-11-25 DIAGNOSIS — C73 PAPILLARY MICROCARCINOMA OF THYROID (HCC): ICD-10-CM

## 2024-11-25 DIAGNOSIS — F39 MOOD DISORDER (HCC): ICD-10-CM

## 2024-11-25 DIAGNOSIS — E89.0 POST-SURGICAL HYPOTHYROIDISM: Primary | ICD-10-CM

## 2024-11-25 PROCEDURE — 99204 OFFICE O/P NEW MOD 45 MIN: CPT | Performed by: INTERNAL MEDICINE

## 2024-11-25 RX ORDER — LEVOTHYROXINE SODIUM 112 MCG
112 TABLET ORAL DAILY
Qty: 30 TABLET | Refills: 3 | Status: SHIPPED | OUTPATIENT
Start: 2024-11-25

## 2024-11-25 NOTE — TELEPHONE ENCOUNTER
Msg left to call the office and schedule with a new provider, previously seen by Dr Bridges, appt should be 40 min

## 2024-11-25 NOTE — TELEPHONE ENCOUNTER
----- Message from Lisseth Neville DO sent at 11/25/2024  2:35 PM EST -----  Please notify pt that her whit cell count was elevated and she has a new signficant anemia, her thyroid labs were also still elevated but improved. Needs f/u appt to discuss changes for  thyroid meds and eval the new anemia.  Please make appt with anyone to review results and transfer care (40 min)

## 2024-11-25 NOTE — PROGRESS NOTES
Dionne Ford 49 y.o. female MRN: [id number]    Encounter: [id number]      Assessment & Plan   1. Post-surgical hypothyroidism  -     TSH, 3rd generation; Future; Expected date: 01/13/2025  -     T4, free; Future; Expected date: 01/13/2025  -     Thyroglobulin; Future; Expected date: 01/13/2025  -     Anti-thyroglobulin antibody; Future; Expected date: 01/13/2025  -     Synthroid 112 MCG tablet; Take 1 tablet (112 mcg total) by mouth daily  -     TSH, 3rd generation  -     T4, free  -     Thyroglobulin  -     Anti-thyroglobulin antibody  2. Papillary microcarcinoma of thyroid (HCC)    Plan:       Given elevated TSH at 5.1 with history of micropapillary thyroid CA, even with low metastatic potential we will aim for a TSH below 2.5; patient currently does not have significant symptoms due to her thyroid disease.  With elevated TSH at 5.1 on Synthroid 100 mcg, will increase dose of Levothyroxine to 112mcg QD to prevent thyroid gland overstimulation in order to avoid recurrence of malignancy of any possible remnant tissue.  Ordered repeat TSH and FT4 to be done in 6 weeks with TG and TG Ab  Last thyroid US in 11/2019 post surgery, defer repeat imaging to Dr. Corona's discretion      CC: thyroid nodule    History of Present Illness     HPI:  Dionne Ford is a 49 y.o. female presents for new patient evaluation for post surgical hypothyroidism.  Patient had total thyroidectomy by Dr Corona in September 2019 for enlarged R thyroid nodule 6.5 cm (with FNA showing benign [place] II) and pathology revealed an incidental Pt1a micropapillary CA right thyroid. She has subsequently been on Synthroid for post surgical hypothyroidism  Patient has a PMH significant for severe anxiety, agoraphobia, sinus tachycardia, BMI 40   Seen by our Endo office last in 2020 and then lost follow up with non compliance. At the time TSH had elevated to 18 and TG was nl.  TSH has been elevated on recent lab 11/15 at 5.1 with nl  FT4.  and patient experiences      Currently on Synthroid 100 mcg at 3-4 pm, wakes up around noon, takes it a few hours after coffee at 3-4 pm and a couple of hours before her dinner and multivitamins   Was self medicated on increased dose of Synthroid 150 mcg for past 2.5 years when she lost follow up with us and felt better on that dose but then decreased her dose back to 100 mcg about 3 months ago.  Currently on Propranolol for anxiety and HR     Associated signs/symptoms:  Trouble swallowing  No  Hoarseness/change in voice:  {No  Trouble breathing  No     H/o Radiation to the head or neck region No  History of thyroid cancer in one or more first degree relativesNo  Family history of thyroid disease: No     Fatigue: Yes  No weight loss/ No weight gain  NoChange in appetite  Yesheat intolerance/ Yescold intolerance  Nodiarrhea/ Noconstipation  Nosweating/Notremor/Yespalpitation  Yesdifficulty sleeping  Heavy menstruation, regular cycles   Brittle nails, dry hair      Review of Systems  All other systems were reviewed and are negative.      Historical Information   Past Medical History:   Diagnosis Date    Anemia     Anxiety     Asthma     mild     Cancer (HCC) 2019    Thyroid    Disease of thyroid gland 2019    Removed    GERD (gastroesophageal reflux disease)     Ingrown toenail 2024    Painful    Palpitations      Past Surgical History:   Procedure Laterality Date    APPENDECTOMY      AUGMENTATION MAMMAPLASTY       SECTION      DENTAL SURGERY      THYROIDECTOMY Bilateral 2019    Procedure: TOTAL THYROIDECTOMY;  Surgeon: Greg Corona MD;  Location: [facility];  Service: Surgical Oncology    US GUIDED THYROID BIOPSY  10/8/2019     Social History   Social History     Substance and Sexual Activity   Alcohol Use Not Currently    Comment: Social     Social History     Substance and Sexual Activity   Drug Use No     Social History     Tobacco Use   Smoking Status Every Day    Current packs/day: 1.50     Average packs/day: 1.5 packs/day for 20.0 years (30.0 ttl pk-yrs)    Types: Cigarettes   Smokeless Tobacco Never     Family History:   Family History   Problem Relation Age of Onset    Heart disease Mother     Hypertension Mother     Heart disease Paternal Grandfather        Meds/Allergies   Current Outpatient Medications   Medication Sig Dispense Refill    LORazepam (ATIVAN) 0.5 mg tablet Take 1 or 2 tablets every 6 hours as needed for anxiety 120 tablet 0    propranolol (INDERAL) 10 mg tablet TAKE 2 TABLETS BY MOUTH FOUR TIMES A  tablet 1    Synthroid 112 MCG tablet Take 1 tablet (112 mcg total) by mouth daily 30 tablet 3    busPIRone (BUSPAR) 5 mg tablet Take 1 tablet (5 mg total) by mouth 2 (two) times a day (Patient not taking: Reported on 11/25/2024) 60 tablet 5    butalbital-acetaminophen-caffeine (FIORICET,ESGIC) -40 mg per tablet Take 1 tablet by mouth every 4 (four) hours as needed for headaches (Patient not taking: Reported on 11/25/2024) 30 tablet 0    clomiPRAMINE (ANAFRANIL) 25 mg capsule Take 1 capsule (25 mg total) by mouth daily at bedtime (Patient not taking: Reported on 11/25/2024) 30 capsule 0    clonazePAM (KlonoPIN) 1 mg tablet TAKE 1 TABLET BY MOUTH DAILY AT BEDTIME (Patient not taking: Reported on 11/25/2024) 30 tablet 2    famotidine (PEPCID) 10 mg tablet Take 10 mg by mouth 2 (two) times a day (Patient not taking: Reported on 12/2/2022)      FLUoxetine (PROzac) 40 MG capsule TAKE 1 CAPSULE BY MOUTH EVERY DAY (Patient not taking: Reported on 11/25/2024) 90 capsule 1    furosemide (LASIX) 20 mg tablet Take 1 tablet (20 mg total) by mouth daily (Patient not taking: Reported on 11/25/2024) 30 tablet 5    PARoxetine (PAXIL) 10 mg tablet TAKE 1 TABLET BY MOUTH EVERY DAY (Patient not taking: Reported on 11/25/2024) 90 tablet 1    predniSONE 10 mg tablet Take 4 tablets today, then three tablets daily for 2 days, two tablets daily for 2 days, then one tablet daily for 2 days.  "(Patient not taking: Reported on 11/25/2024) 16 tablet 0     No current facility-administered medications for this visit.     Allergies   Allergen Reactions    Mirtazapine Anaphylaxis    Hydrocodone-Acetaminophen Vomiting       Objective   Vitals: Blood pressure 110/70, pulse 83, height 5' 9\" (1.753 m), weight 119 kg (263 lb), SpO2 99%.    Physical Exam  Constitutional:       General: She is not in acute distress.  HENT:      Head: Normocephalic.   Eyes:      Conjunctiva/sclera: Conjunctivae normal.   Neck:      Comments: No swelling or palpable nodule  Cardiovascular:      Rate and Rhythm: Normal rate.   Pulmonary:      Effort: Pulmonary effort is normal.   Musculoskeletal:         General: Normal range of motion.      Cervical back: Neck supple.   Skin:     General: Skin is warm.   Neurological:      Mental Status: She is alert and oriented to person, place, and time. Mental status is at baseline.      Comments: No tremors   Psychiatric:         Mood and Affect: Mood normal.       .    Lab Results:      Lab Results   Component Value Date    TSH 5.110 (H) 11/20/2024      TSH 5.110 High  18.600 High  0.013     omponent  Ref Range & Units (hover) 11/20/24  1:29 PM   T4,Free (Direct) 1.24                Latest Reference Range & Units 07/23/19 09:45 12/03/20 11:20   THYROGLOBULIN AB 0.0 - 0.9 IU/mL <1.0 <1.0   Thyroglobulin-DAYANNA 1.5 - 38.5 ng/mL  1.8     Imaging Studies:   Results for orders placed during the hospital encounter of 07/24/19    US thyroid    Impression  Right thyroid lobe nodules, the largest measuring up to 2.4 cm representing a mixed cystic and solid nodule or calcified of palpable finding in the right neck.  Although neither of the right thyroid lobe nodules technically meets ACR criteria for  recommendation of fine-needle aspiration biopsy or follow-up ultrasound, because the finding is newly palpable, I would recommend interval ultrasound follow-up at 6-12 months.    Left thyroid lobe is either " "hypoplastic or aplastic and is not sonographically detectable.        Reference: ACR Thyroid Imaging, Reporting and Data System (TI-RADS): White Paper of the ACR TI-RADS Committee. J AM Erin Radiol 2017;14:587-595. (additional recommendations based on American Thyroid Association 2015 guidelines.)      Workstation performed: [id number]    Component  Ref Range & Units (hover)    Case Report   Non-gynecologic Cytology                          Case: [id number]                                   Authorizing Provider:  Arnel Santoro DO       Collected:           10/08/2019 1312               Ordering Location:     [facility]        Received:            10/08/2019 [address] [place] [address] Ultrasound                                                         Pathologist:           Holly Lainez MD                                                               Specimens:   A) - Thyroid, Right, midpole                                                                        B) - Thyroid, Right, midpole                                                                        C) - Thyroid, Right, lower inferior pole                                                            D) - Thyroid, Right, lower inferior pole                                                  Final Diagnosis   A.B. Thyroid, Right, mid-pole ( ThinPrep and smear preparations ):  Benign ([place] Category II) - See note.  Consistent with benign follicular nodule with cystic degeneration     Note: As reported in the [place] System for Reporting Thyroid Cytopathology*, the diagnostic category of \"benign/negative for malignancy\" carries a 0-3% risk of malignancy being found in subsequent resections (in the few studies of patients with benign FNA results that were followed long-term, a false negative rate of 0-3% was reported), with the usual management being clinical follow-up supplemented by ultrasonography (US) as indicated. Repeat " "FNA may be indicated for nodules showing significant growth or developing US abnormalities. Ultimately, clinical/imaging correlation for this patient is needed in arriving at the actual management plan.     *The [place] System for Reporting Thyroid Cytopathology, Faisal Green., Garfield Wagner. (Eds.), 2018 (2nd ed.)     Satisfactory for evaluation.        C.D. Thyroid, Right, lower inferior pole ( ThinPrep and smear preparations ):  Benign ([place] Category II) - See note.  Consistent with a benign follicular nodule      Satisfactory for evaluation.        Note: As reported in the [place] System for Reporting Thyroid Cytopathology*, the diagnostic category of \"benign/negative for malignancy\" carries a 0-3% risk of malignancy being found in subsequent resections (in the few studies of patients with benign FNA results that were followed long-term, a false negative rate of 0-3% was reported), with the usual management being clinical follow-up supplemented by ultrasonography (US) as indicated. Repeat FNA may be indicated for nodules showing significant growth or developing US abnormalities. Ultimately, clinical/imaging correlation for this patient is needed in arriving at the actual management plan.     *The [place] System for Reporting Thyroid Cytopathology, Faisal Green., Garfield Wagner. (Eds.), 2018 (2nd ed.)                        Electronically signed by Holly Lainez MD on 10/9/2019 at 11:33 AM   Note    The treating physician has requested a sample(s) from the above thyroid nodule(s) be sent for analysis by  [facility]  ([facility] GEC), performed by [employer] ([place], [place]).  [employer] only performs this test on specimen(s) receiving a cytologic diagnosis of [place] Category III or  IV. If such a diagnosis is rendered (above) specimen will be sent to [employer], and a separate report with  results of [facility] GEC will follow directly from [employer] (typically taking 14 days). If a " cytologic  interpretation other than [place] category III or IV is rendered, specimen will not be sent for [facility].      Intraoperative Consultation    Thyroid, right midpole,   FNAB on-site evaluation: Adequate.     Thyroid, right lower inferior,   FNAB on-site evaluation: Adequate.     Rita Saldivar         Gross Description    A. Thyroid, Right, midpole: 20ml, colorless, clear, received in CytoLyt     B. Thyroid, Right, midpole: 4 slides received, ( 2 diff quik / 2 alcohol fixed )     C. Thyroid, Right, lower inferior pole: 20ml, colorless, clear, received in CytoLyt     D. Thyroid, Right, lower inferior pole: 4 slides received, ( 2 diff quik / 2 alcohol fixed )         Clinical Information    Size: RMP-2x1.3x1.7cm Margins: SMOOTH Echogenicity: SOLID/CYSTIC Microcalcs: ABSENT Flow: PERIPHERAL Size change: MINIMAL Suspicion level: N/A Hx of Hashimoto's Thyroiditis: NO    Size: RLI-1.6x0.9x1.5cm Margins: SMOOTH Echogenicity: SOLID Microcalcs: ABSENT Flow: INTRANODULAR/PERIPHERAL Size change: SIGNIFICANT Suspicion level: N/A Hx of Hashimoto's Thyroiditis: NO   Additional Information    [employer] FDA approved ,  and ThinPrep Imaging Duo System are utilized with strict adherence to the 's instruction manual to prepare gynecologic and non-gynecologic cytology specimens for the production of ThinPrep slides as well as for gynecologic ThinPrep imaging. These processes have been validated by our laboratory and/or by the .     These tests were developed and their performance characteristics determined by [facility] or [employer]. They may not be cleared or approved by the U.S. Food and Drug Administration. The FDA has determined that such clearance or approval is not necessary. These tests are used for clinical purposes. They should not be regarded as investigational or for research. This laboratory has been approved by CLIA 88, designated as a  high-complexity laboratory and is qualified to perform these tests.      Resulting Agency [facility]             Specimen Collected: 10/08/19  1:12 PM Last Resulted: 10/09/19 11:33 AM     Case Report   Surgical Pathology Report                         Case: [id number]                                   Authorizing Provider:  Greg Corona MD           Collected:           11/08/2019 1608               Ordering Location:     [facility]        Received:            11/08/2019 [address] [place] [address] Operating Room                                                       Pathologist:           Michelle Diaz MD                                                               Specimen:    Thyroid, TOTAL THYROIDECTOMY                                                              Final Diagnosis   A. Thyroid, total thyroidectomy”.  -Nodular goiter with microscopic hyperplastic nodules showing oncocytic alterations and reactive cytological changes.  -Adenomatoid nodule with Hurthle cell change  -Single incidental focus of papillary thyroid microcarcinoma, located in the in Right lobe 2 mm, confined to the thyroid  -Changes consistent with previous needle biopsy present.  -Single adjacent benign lymph node present.  -Fragment of adjacent benign skeletal muscle present  -No parathyroid glands identified.   Electronically signed by Michelle Diaz MD on 11/19/2019 at 12:54 PM   Note    HBME-1 stain highlights tumor cells.  Controls reacted appropriately  Intradepartmental consultation is in agreement  Interpretation performed at [facility], [address] [facility] [address]        Clinical data  Op notes  Preop Diagnosis:  Multiple thyroid nodules [E04.2]   Post-Op Diagnosis Codes:     * Multiple thyroid nodules [E04.2]   Procedure(s) (LRB):  TOTAL THYROIDECTOMY (Bilateral)   Additional Information    All controls performed with the immunohistochemical stains reported above reacted  "appropriately.  These tests were developed and their performance characteristics determined by [facility] or [employer]. They may not be cleared or approved by the U.S. Food and Drug Administration. The FDA has determined that such clearance or approval is not necessary. These tests are used for clinical purposes. They should not be regarded as investigational or for research. This laboratory has been approved by CLIA 88, designated as a high-complexity laboratory and is qualified to perform these tests.      Synoptic Checklist   THYROID GLAND   8th Edition - Protocol posted: 2/27/2019Thyroid - All Specimens       CLINICAL   Clinical History  No known radiation exposure   SPECIMEN   Procedure  Total thyroidectomy   TUMOR   Tumor Focality  Unifocal        Tumor Site  Right lobe   Histologic Type  Papillary carcinoma, classic (usual, conventional)   Tumor Size (Centimeters)  Greatest Dimension (Centimeters): 0.2 cm   Additional Dimension (Centimeters)  0.1 cm   Extrathyroidal Extension  Not identified   Angioinvasion (vascular invasion)  Not identified   Lymphatic Invasion  Not identified   Perineural Invasion  Not identified   Margins  Uninvolved by carcinoma   LYMPH NODES   Number of Lymph Nodes Involved  0   Number of Lymph Nodes Examined  1   Peter Levels Examined  Level VI   PATHOLOGIC STAGE CLASSIFICATION (pTNM, AJCC 8th Edition)        Primary Tumor (pT)  pT1a   Regional Lymph Nodes (pN)  pN0a   ADDITIONAL FINDINGS   Additional Pathologic Findings  Adenomatoid nodule(s) or nodular follicular disease   .      Gross Description    A. The specimen is received in formalin, labeled with the patient's name and hospital number, and is designated \"total thyroidectomy”.  The specimen consists of a 17 g tan purple portion of thyroid tissue measuring 4.3 by 3.8 by 2.6 cm.  Grossly the specimen appears to be nearly entirely composed of right lobe with possible focal left lobe tissue " "present (operative report indicates left lobe essentially absent).  The anterior surface is inked blue and the posterior surface is inked black.  The specimen is sectioned revealing a dominant hemorrhagic cystic nodule with a thick capsule in the right lobe, measuring 1.6 x 1.5 x 1.3 cm.  Also noted are multiple small colorless cystic nodules measuring up to 1.3 cm each.  Entirely submitted.  21 cassettes.  1:  Presumed left lobe, bisected  2:  Isthmus  3-21:  Right lobe, submitted superior to inferior, with split full width sections in cassettes 7-8, 9-10, 11-12, 13-14, 15-16, 17-18; dominant cystic nodule in cassettes 11, 13, 15, 17, 19     Note: The estimated total formalin fixation time based upon information provided by the submitting clinician and the standard processing schedule is under 72 hours.     MCrites          Resulting Agency [facility]             Specimen Collected: 11/08/19  4:08 PM Last Resulted: 11/19/19 12:54 PM                Portions of the record may have been created with voice recognition software. Occasional wrong word or \"sound a like\" substitutions may have occurred due to the inherent limitations of voice recognition software. Read the chart carefully and recognize, using context, where substitutions have occurred.    "

## 2024-11-29 ENCOUNTER — TELEPHONE (OUTPATIENT)
Dept: FAMILY MEDICINE CLINIC | Facility: HOSPITAL | Age: 49
End: 2024-11-29

## 2024-11-29 NOTE — RESULT ENCOUNTER NOTE
Patient aware.  She is transferring from our office.  She has appointment scheduled with her new provider.

## 2024-12-02 ENCOUNTER — TELEPHONE (OUTPATIENT)
Age: 49
End: 2024-12-02

## 2024-12-02 NOTE — TELEPHONE ENCOUNTER
Patients spouse had called in and mentioned that they had received a call from Radha, and was looking to speak with her.     Office currently unavailable at the time of assistance, please advise out to patient.

## 2024-12-02 NOTE — TELEPHONE ENCOUNTER
Patient called stating she was in the [facility] ED yesterday and they did lab work. She states her TSH and T4 are elevated. She asked if the doctor can review her labs and call with any changes.

## 2024-12-05 ENCOUNTER — HOSPITAL ENCOUNTER (EMERGENCY)
Facility: HOSPITAL | Age: 49
Discharge: HOME | End: 2024-12-05
Attending: EMERGENCY MEDICINE
Payer: COMMERCIAL

## 2024-12-05 ENCOUNTER — APPOINTMENT (EMERGENCY)
Dept: ULTRASOUND IMAGING | Facility: HOSPITAL | Age: 49
End: 2024-12-05
Payer: COMMERCIAL

## 2024-12-05 VITALS
DIASTOLIC BLOOD PRESSURE: 70 MMHG | HEART RATE: 77 BPM | TEMPERATURE: 97.9 F | SYSTOLIC BLOOD PRESSURE: 109 MMHG | OXYGEN SATURATION: 97 % | RESPIRATION RATE: 18 BRPM

## 2024-12-05 DIAGNOSIS — N93.9 VAGINAL BLEEDING: ICD-10-CM

## 2024-12-05 DIAGNOSIS — D64.9 ANEMIA: Primary | ICD-10-CM

## 2024-12-05 PROBLEM — Z85.850 HISTORY OF THYROID CANCER: Status: ACTIVE | Noted: 2019-11-22

## 2024-12-05 PROBLEM — Z08 ENCOUNTER FOR FOLLOW-UP EXAMINATION AFTER COMPLETED TREATMENT FOR MALIGNANT NEOPLASM: Status: ACTIVE | Noted: 2024-12-05

## 2024-12-05 LAB
ALBUMIN SERPL BCG-MCNC: 4.4 G/DL (ref 3.5–5)
ALP SERPL-CCNC: 77 U/L (ref 34–104)
ALT SERPL W P-5'-P-CCNC: 7 U/L (ref 7–52)
ANION GAP SERPL CALCULATED.3IONS-SCNC: 6 MMOL/L (ref 4–13)
AST SERPL W P-5'-P-CCNC: 12 U/L (ref 13–39)
BASOPHILS # BLD AUTO: 0.12 THOUSANDS/ÂΜL (ref 0–0.1)
BASOPHILS NFR BLD AUTO: 1 % (ref 0–1)
BILIRUB SERPL-MCNC: 0.35 MG/DL (ref 0.2–1)
BUN SERPL-MCNC: 9 MG/DL (ref 5–25)
CA-I BLD-SCNC: 1.13 MMOL/L (ref 1.12–1.32)
CALCIUM SERPL-MCNC: 9 MG/DL (ref 8.4–10.2)
CARDIAC TROPONIN I PNL SERPL HS: 3 NG/L (ref 8–18)
CHLORIDE SERPL-SCNC: 104 MMOL/L (ref 96–108)
CO2 SERPL-SCNC: 29 MMOL/L (ref 21–32)
CREAT SERPL-MCNC: 0.9 MG/DL (ref 0.6–1.3)
EOSINOPHIL # BLD AUTO: 0.73 THOUSAND/ÂΜL (ref 0–0.61)
EOSINOPHIL NFR BLD AUTO: 5 % (ref 0–6)
ERYTHROCYTE [DISTWIDTH] IN BLOOD BY AUTOMATED COUNT: 22.6 % (ref 11.6–15.1)
GFR SERPL CREATININE-BSD FRML MDRD: 75 ML/MIN/1.73SQ M
GLUCOSE SERPL-MCNC: 80 MG/DL (ref 65–140)
HCT VFR BLD AUTO: 29.3 % (ref 34.8–46.1)
HGB BLD-MCNC: 7.6 G/DL (ref 11.5–15.4)
IMM GRANULOCYTES # BLD AUTO: 0.17 THOUSAND/UL (ref 0–0.2)
IMM GRANULOCYTES NFR BLD AUTO: 1 % (ref 0–2)
LYMPHOCYTES # BLD AUTO: 2.36 THOUSANDS/ÂΜL (ref 0.6–4.47)
LYMPHOCYTES NFR BLD AUTO: 16 % (ref 14–44)
MCH RBC QN AUTO: 18.1 PG (ref 26.8–34.3)
MCHC RBC AUTO-ENTMCNC: 25.9 G/DL (ref 31.4–37.4)
MCV RBC AUTO: 70 FL (ref 82–98)
MONOCYTES # BLD AUTO: 1.24 THOUSAND/ÂΜL (ref 0.17–1.22)
MONOCYTES NFR BLD AUTO: 8 % (ref 4–12)
NEUTROPHILS # BLD AUTO: 10.53 THOUSANDS/ÂΜL (ref 1.85–7.62)
NEUTS SEG NFR BLD AUTO: 69 % (ref 43–75)
NRBC BLD AUTO-RTO: 0 /100 WBCS
PLATELET # BLD AUTO: 368 THOUSANDS/UL (ref 149–390)
PMV BLD AUTO: 11 FL (ref 8.9–12.7)
POTASSIUM SERPL-SCNC: 4 MMOL/L (ref 3.5–5.3)
PROT SERPL-MCNC: 7.3 G/DL (ref 6.4–8.4)
RBC # BLD AUTO: 4.2 MILLION/UL (ref 3.81–5.12)
SODIUM SERPL-SCNC: 139 MMOL/L (ref 135–147)
WBC # BLD AUTO: 15.15 THOUSAND/UL (ref 4.31–10.16)

## 2024-12-05 PROCEDURE — 80053 COMPREHEN METABOLIC PANEL: CPT | Performed by: EMERGENCY MEDICINE

## 2024-12-05 PROCEDURE — 93005 ELECTROCARDIOGRAM TRACING: CPT

## 2024-12-05 PROCEDURE — 99285 EMERGENCY DEPT VISIT HI MDM: CPT | Performed by: EMERGENCY MEDICINE

## 2024-12-05 PROCEDURE — 85025 COMPLETE CBC W/AUTO DIFF WBC: CPT | Performed by: EMERGENCY MEDICINE

## 2024-12-05 PROCEDURE — 82330 ASSAY OF CALCIUM: CPT | Performed by: EMERGENCY MEDICINE

## 2024-12-05 PROCEDURE — 36415 COLL VENOUS BLD VENIPUNCTURE: CPT | Performed by: EMERGENCY MEDICINE

## 2024-12-05 PROCEDURE — 99284 EMERGENCY DEPT VISIT MOD MDM: CPT

## 2024-12-05 PROCEDURE — 84484 ASSAY OF TROPONIN QUANT: CPT | Performed by: EMERGENCY MEDICINE

## 2024-12-06 ENCOUNTER — APPOINTMENT (EMERGENCY)
Dept: RADIOLOGY | Facility: HOSPITAL | Age: 49
End: 2024-12-06
Payer: COMMERCIAL

## 2024-12-06 ENCOUNTER — HOSPITAL ENCOUNTER (EMERGENCY)
Facility: HOSPITAL | Age: 49
Discharge: HOME | End: 2024-12-06
Attending: EMERGENCY MEDICINE
Payer: COMMERCIAL

## 2024-12-06 VITALS
DIASTOLIC BLOOD PRESSURE: 69 MMHG | TEMPERATURE: 97.1 F | SYSTOLIC BLOOD PRESSURE: 130 MMHG | OXYGEN SATURATION: 99 % | HEART RATE: 76 BPM | BODY MASS INDEX: 38.84 KG/M2 | RESPIRATION RATE: 14 BRPM | WEIGHT: 263 LBS

## 2024-12-06 DIAGNOSIS — R07.89 CHEST TIGHTNESS: Primary | ICD-10-CM

## 2024-12-06 DIAGNOSIS — E89.0 H/O TOTAL THYROIDECTOMY: ICD-10-CM

## 2024-12-06 LAB
ALBUMIN SERPL BCG-MCNC: 4.2 G/DL (ref 3.5–5)
ALP SERPL-CCNC: 75 U/L (ref 34–104)
ALT SERPL W P-5'-P-CCNC: 9 U/L (ref 7–52)
ANION GAP SERPL CALCULATED.3IONS-SCNC: 9 MMOL/L (ref 4–13)
AST SERPL W P-5'-P-CCNC: 46 U/L (ref 13–39)
ATRIAL RATE: 85 BPM
ATRIAL RATE: 93 BPM
BASOPHILS # BLD AUTO: 0.12 THOUSANDS/ÂΜL (ref 0–0.1)
BASOPHILS NFR BLD AUTO: 1 % (ref 0–1)
BILIRUB SERPL-MCNC: 0.38 MG/DL (ref 0.2–1)
BUN SERPL-MCNC: 11 MG/DL (ref 5–25)
CALCIUM SERPL-MCNC: 8.9 MG/DL (ref 8.4–10.2)
CARDIAC TROPONIN I PNL SERPL HS: <2 NG/L (ref ?–50)
CARDIAC TROPONIN I PNL SERPL HS: <2 NG/L (ref ?–50)
CHLORIDE SERPL-SCNC: 102 MMOL/L (ref 96–108)
CO2 SERPL-SCNC: 26 MMOL/L (ref 21–32)
CREAT SERPL-MCNC: 0.95 MG/DL (ref 0.6–1.3)
EOSINOPHIL # BLD AUTO: 0.75 THOUSAND/ÂΜL (ref 0–0.61)
EOSINOPHIL NFR BLD AUTO: 5 % (ref 0–6)
ERYTHROCYTE [DISTWIDTH] IN BLOOD BY AUTOMATED COUNT: 23.6 % (ref 11.6–15.1)
GFR SERPL CREATININE-BSD FRML MDRD: 71 ML/MIN/1.73SQ M
GLUCOSE SERPL-MCNC: 117 MG/DL (ref 65–140)
HCT VFR BLD AUTO: 30 % (ref 34.8–46.1)
HGB BLD-MCNC: 7.8 G/DL (ref 11.5–15.4)
IMM GRANULOCYTES # BLD AUTO: 0.13 THOUSAND/UL (ref 0–0.2)
IMM GRANULOCYTES NFR BLD AUTO: 1 % (ref 0–2)
LYMPHOCYTES # BLD AUTO: 2.67 THOUSANDS/ÂΜL (ref 0.6–4.47)
LYMPHOCYTES NFR BLD AUTO: 19 % (ref 14–44)
MCH RBC QN AUTO: 18.2 PG (ref 26.8–34.3)
MCHC RBC AUTO-ENTMCNC: 26 G/DL (ref 31.4–37.4)
MCV RBC AUTO: 70 FL (ref 82–98)
MONOCYTES # BLD AUTO: 1 THOUSAND/ÂΜL (ref 0.17–1.22)
MONOCYTES NFR BLD AUTO: 7 % (ref 4–12)
NEUTROPHILS # BLD AUTO: 9.68 THOUSANDS/ÂΜL (ref 1.85–7.62)
NEUTS SEG NFR BLD AUTO: 67 % (ref 43–75)
NRBC BLD AUTO-RTO: 0 /100 WBCS
P AXIS: 59 DEGREES
P AXIS: 71 DEGREES
PLATELET # BLD AUTO: 361 THOUSANDS/UL (ref 149–390)
PMV BLD AUTO: 11 FL (ref 8.9–12.7)
POTASSIUM SERPL-SCNC: 4.3 MMOL/L (ref 3.5–5.3)
PR INTERVAL: 176 MS
PR INTERVAL: 188 MS
PROT SERPL-MCNC: 7.3 G/DL (ref 6.4–8.4)
QRS AXIS: 78 DEGREES
QRS AXIS: 79 DEGREES
QRSD INTERVAL: 82 MS
QRSD INTERVAL: 84 MS
QT INTERVAL: 348 MS
QT INTERVAL: 360 MS
QTC INTERVAL: 428 MS
QTC INTERVAL: 433 MS
RBC # BLD AUTO: 4.29 MILLION/UL (ref 3.81–5.12)
SODIUM SERPL-SCNC: 137 MMOL/L (ref 135–147)
T WAVE AXIS: 70 DEGREES
T WAVE AXIS: 73 DEGREES
T4 FREE SERPL-MCNC: 0.9 NG/DL (ref 0.61–1.12)
TSH SERPL DL<=0.05 MIU/L-ACNC: 16.62 UIU/ML (ref 0.45–4.5)
VENTRICULAR RATE: 85 BPM
VENTRICULAR RATE: 93 BPM
WBC # BLD AUTO: 14.35 THOUSAND/UL (ref 4.31–10.16)

## 2024-12-06 PROCEDURE — 93010 ELECTROCARDIOGRAM REPORT: CPT | Performed by: INTERNAL MEDICINE

## 2024-12-06 PROCEDURE — 85025 COMPLETE CBC W/AUTO DIFF WBC: CPT

## 2024-12-06 PROCEDURE — 84439 ASSAY OF FREE THYROXINE: CPT

## 2024-12-06 PROCEDURE — 80053 COMPREHEN METABOLIC PANEL: CPT

## 2024-12-06 PROCEDURE — 84484 ASSAY OF TROPONIN QUANT: CPT

## 2024-12-06 PROCEDURE — 84443 ASSAY THYROID STIM HORMONE: CPT

## 2024-12-06 PROCEDURE — 99285 EMERGENCY DEPT VISIT HI MDM: CPT

## 2024-12-06 PROCEDURE — 93005 ELECTROCARDIOGRAM TRACING: CPT

## 2024-12-06 PROCEDURE — 36415 COLL VENOUS BLD VENIPUNCTURE: CPT

## 2024-12-06 PROCEDURE — 99285 EMERGENCY DEPT VISIT HI MDM: CPT | Performed by: EMERGENCY MEDICINE

## 2024-12-06 PROCEDURE — 71045 X-RAY EXAM CHEST 1 VIEW: CPT

## 2024-12-06 NOTE — ED PROVIDER NOTES
"  ED Disposition       None          Assessment & Plan       Medical Decision Making  49-year-old female presenting for concerns as noted in HPI.  Repeat blood work shows that she has persistent leukocytosis without obvious signs of infection.  Her hemoglobin has gone from 8.6-7.6.  She is not having dark dark stools or concerns for rectal bleeding.  Patient states that she is acceptable to having an ultrasound to assess her uterus for fibroids other causes of active bleeding.  Has appointment with PCP and surgeon for her thyroid procedure tomorrow.  Would prefer to be discharged for outpatient follow-up unless life threatening emergency identified on US.   Dr. Christy will follow-up on ultrasound results, can likely be discharged home.    Patient elected to not wait in the emergency room for ultrasound.  Vital signs remained okay, has close follow-up tomorrow with a medical provider, will return to ED for any worsening symptoms.    Amount and/or Complexity of Data Reviewed  Labs: ordered.  Radiology: ordered.             Medications - No data to display    ED Risk Strat Scores                                               History of Present Illness       Chief Complaint   Patient presents with    Medical Problem     Pt reported that she started having pins and needles feeling all over her body that started 10 days ago. Pt also reports feeling lightheaded and also reports that her L ear \"seems blocked\". Pt also reports frequent urination and blood in urine.        Past Medical History:   Diagnosis Date    Anemia     Anxiety     Asthma     mild     Cancer (HCC) 2019    Thyroid    Disease of thyroid gland 2019    Removed    GERD (gastroesophageal reflux disease)     Ingrown toenail 2024    Painful    Palpitations       Past Surgical History:   Procedure Laterality Date    APPENDECTOMY      AUGMENTATION MAMMAPLASTY       SECTION      DENTAL SURGERY      THYROIDECTOMY Bilateral 2019    Procedure: TOTAL " THYROIDECTOMY;  Surgeon: Greg Corona MD;  Location: [facility];  Service: Surgical Oncology    US GUIDED THYROID BIOPSY  10/8/2019      Family History   Problem Relation Age of Onset    Heart disease Mother     Hypertension Mother     Heart disease Paternal Grandfather       Social History     Tobacco Use    Smoking status: Every Day     Current packs/day: 1.50     Average packs/day: 1.5 packs/day for 20.0 years (30.0 ttl pk-yrs)     Types: Cigarettes    Smokeless tobacco: Never   Vaping Use    Vaping status: Never Used   Substance Use Topics    Alcohol use: Not Currently     Comment: Social    Drug use: No      E-Cigarette/Vaping    E-Cigarette Use Never User       E-Cigarette/Vaping Substances    Nicotine No     THC No     CBD No     Flavoring No     Other No     Unknown No       I have reviewed and agree with the history as documented.     49-year-old female, history of thyroidectomy, presenting for concerns of feelings of warmth and paresthesias in various extremities.  Concerned it may be related to thyroid removal and her Synthroid level being low.  Patient had recent increase to 2 elevated TSH.  Also found to have anemia on recent visit outside facility for syncopal episode which she had a CTA showing known chronic mass in her brain that is nonsurgical in nature.  No stroke or trauma was seen.  Patient was able to be discharged.  Does note that she is also having some brown streaking when wiping after urination.  No dysuria or frequency.  Patient did start taking a low-dose iron at home.  Denies any fatigue or shortness of breath with activity.  States that for most of her life simply standing in the shower does cause her to have a heart rate of 110s to 115.  Per  and her she feels like she is otherwise at her baseline minus the paresthesia symptoms.  No headache no neck pain no fevers or chills, no vision changes.        Review of Systems   Constitutional: Negative.  Negative for chills and fever.    HENT: Negative.  Negative for rhinorrhea, sore throat, trouble swallowing and voice change.    Eyes: Negative.  Negative for pain and visual disturbance.   Respiratory: Negative.  Negative for cough, shortness of breath and wheezing.    Cardiovascular: Negative.  Negative for chest pain and palpitations.   Gastrointestinal:  Negative for abdominal pain, diarrhea, nausea and vomiting.   Genitourinary: Negative.  Negative for dysuria and frequency.   Musculoskeletal: Negative.  Negative for neck pain and neck stiffness.   Skin: Negative.  Negative for rash.   Neurological: Negative.  Negative for dizziness, speech difficulty, weakness, light-headedness and numbness.           Objective       ED Triage Vitals [12/05/24 1651]   Temperature Pulse Blood Pressure Respirations SpO2 Patient Position - Orthostatic VS   97.9 °F (36.6 °C) 104 121/89 18 98 % Sitting      Temp Source Heart Rate Source BP Location FiO2 (%) Pain Score    Temporal Monitor Right arm -- No Pain      Vitals      Date and Time Temp Pulse SpO2 Resp BP Pain Score FACES Pain Rating User   12/05/24 2000 -- 83 97 % 18 113/59 -- -- NB   12/05/24 1850 -- 78 98 % 16 120/60 -- -- EC   12/05/24 1651 97.9 °F (36.6 °C) 104 98 % 18 121/89 No Pain -- RN            Physical Exam  Vitals and nursing note reviewed.   Constitutional:       General: She is not in acute distress.     Appearance: She is well-developed.   HENT:      Head: Normocephalic and atraumatic.   Eyes:      Conjunctiva/sclera: Conjunctivae normal.      Pupils: Pupils are equal, round, and reactive to light.   Neck:      Trachea: No tracheal deviation.   Cardiovascular:      Rate and Rhythm: Normal rate and regular rhythm.   Pulmonary:      Effort: Pulmonary effort is normal. No respiratory distress.      Breath sounds: Normal breath sounds. No wheezing or rales.   Abdominal:      General: Bowel sounds are normal. There is no distension.      Palpations: Abdomen is soft.      Tenderness: There is no  abdominal tenderness. There is no guarding or rebound.   Musculoskeletal:         General: No tenderness or deformity. Normal range of motion.      Cervical back: Normal range of motion and neck supple.   Skin:     General: Skin is warm and dry.      Capillary Refill: Capillary refill takes less than 2 seconds.      Findings: No rash.   Neurological:      Mental Status: She is alert and oriented to person, place, and time.      Cranial Nerves: Cranial nerves 2-12 are intact.      Sensory: Sensation is intact.      Motor: Motor function is intact.      Coordination: Coordination is intact.      Gait: Gait is intact.   Psychiatric:         Behavior: Behavior normal.         Results Reviewed       Procedure Component Value Units Date/Time    CBC and differential [[id number]]  (Abnormal) Collected: 12/05/24 1920    Lab Status: Final result Specimen: Blood from Line Updated: 12/05/24 1957     WBC 15.15 Thousand/uL      RBC 4.20 Million/uL      Hemoglobin 7.6 g/dL      Hematocrit 29.3 %      MCV 70 fL      MCH 18.1 pg      MCHC 25.9 g/dL      RDW 22.6 %      MPV 11.0 fL      Platelets 368 Thousands/uL      nRBC 0 /100 WBCs      Segmented % 69 %      Immature Grans % 1 %      Lymphocytes % 16 %      Monocytes % 8 %      Eosinophils Relative 5 %      Basophils Relative 1 %      Absolute Neutrophils 10.53 Thousands/µL      Absolute Immature Grans 0.17 Thousand/uL      Absolute Lymphocytes 2.36 Thousands/µL      Absolute Monocytes 1.24 Thousand/µL      Eosinophils Absolute 0.73 Thousand/µL      Basophils Absolute 0.12 Thousands/µL     High Sensitivity Troponin I Random [[id number]]  (Abnormal) Collected: 12/05/24 1920    Lab Status: Final result Specimen: Blood from Line Updated: 12/05/24 1956     HS TnI random 3 ng/L     Comprehensive metabolic panel [[id number]]  (Abnormal) Collected: 12/05/24 1920    Lab Status: Final result Specimen: Blood from Line Updated: 12/05/24 1949     Sodium 139 mmol/L      Potassium 4.0 mmol/L       Chloride 104 mmol/L      CO2 29 mmol/L      ANION GAP 6 mmol/L      BUN 9 mg/dL      Creatinine 0.90 mg/dL      Glucose 80 mg/dL      Calcium 9.0 mg/dL      AST 12 U/L      ALT 7 U/L      Alkaline Phosphatase 77 U/L      Total Protein 7.3 g/dL      Albumin 4.4 g/dL      Total Bilirubin 0.35 mg/dL      eGFR 75 ml/min/1.73sq m     Narrative:      National Kidney Disease Foundation guidelines for Chronic Kidney Disease (CKD):     Stage 1 with normal or high GFR (GFR > 90 mL/min/1.73 square meters)    Stage 2 Mild CKD (GFR = 60-89 mL/min/1.73 square meters)    Stage 3A Moderate CKD (GFR = 45-59 mL/min/1.73 square meters)    Stage 3B Moderate CKD (GFR = 30-44 mL/min/1.73 square meters)    Stage 4 Severe CKD (GFR = 15-29 mL/min/1.73 square meters)    Stage 5 End Stage CKD (GFR <15 mL/min/1.73 square meters)  Note: GFR calculation is accurate only with a steady state creatinine    Calcium, ionized [[id number]]  (Normal) Collected: 12/05/24 1937    Lab Status: Final result Specimen: Blood from Arm, Right Updated: 12/05/24 1944     Calcium, Ionized 1.13 mmol/L             US pelvis complete non OB    (Results Pending)       Procedures    ED Medication and Procedure Management   Prior to Admission Medications   Prescriptions Last Dose Informant Patient Reported? Taking?   FLUoxetine (PROzac) 40 MG capsule   No No   Sig: TAKE 1 CAPSULE BY MOUTH EVERY DAY   Patient not taking: Reported on 11/25/2024   LORazepam (ATIVAN) 0.5 mg tablet   No No   Sig: Take 1 or 2 tablets every 6 hours as needed for anxiety   PARoxetine (PAXIL) 10 mg tablet   No No   Sig: TAKE 1 TABLET BY MOUTH EVERY DAY   Patient not taking: Reported on 11/25/2024   Synthroid 112 MCG tablet   No No   Sig: Take 1 tablet (112 mcg total) by mouth daily   busPIRone (BUSPAR) 5 mg tablet   No No   Sig: Take 1 tablet (5 mg total) by mouth 2 (two) times a day   Patient not taking: Reported on 11/25/2024   butalbital-acetaminophen-caffeine (FIORICET,ESGIC) -40 mg per  tablet   No No   Sig: Take 1 tablet by mouth every 4 (four) hours as needed for headaches   Patient not taking: Reported on 11/25/2024   clomiPRAMINE (ANAFRANIL) 25 mg capsule   No No   Sig: Take 1 capsule (25 mg total) by mouth daily at bedtime   Patient not taking: Reported on 11/25/2024   clonazePAM (KlonoPIN) 1 mg tablet   No No   Sig: TAKE 1 TABLET BY MOUTH DAILY AT BEDTIME   Patient not taking: Reported on 11/25/2024   famotidine (PEPCID) 10 mg tablet  Self Yes No   Sig: Take 10 mg by mouth 2 (two) times a day   Patient not taking: Reported on 12/2/2022   furosemide (LASIX) 20 mg tablet   No No   Sig: Take 1 tablet (20 mg total) by mouth daily   Patient not taking: Reported on 11/25/2024   predniSONE 10 mg tablet   No No   Sig: Take 4 tablets today, then three tablets daily for 2 days, two tablets daily for 2 days, then one tablet daily for 2 days.   Patient not taking: Reported on 11/25/2024   propranolol (INDERAL) 10 mg tablet   No No   Sig: TAKE 2 TABLETS BY MOUTH FOUR TIMES A DAY      Facility-Administered Medications: None     Patient's Medications   Discharge Prescriptions    No medications on file     No discharge procedures on file.  ED SEPSIS DOCUMENTATION            Brian Veliz, DO  12/05/24 2051       Brian Veliz, DO  12/05/24 2059       Brian Veliz, DO  12/06/24 0858

## 2024-12-06 NOTE — DISCHARGE INSTRUCTIONS
As discussed, please have repeat hemoglobin testing through your primary care doctor tomorrow.  If you have any concerns for any reason please return to the emergency room for repeat evaluation.

## 2024-12-06 NOTE — ED ATTENDING ATTESTATION
12/6/2024  I, Floyd Yeager MD, saw and evaluated the patient. I have discussed the patient with the resident/non-physician practitioner and agree with the resident's/non-physician practitioner's findings, Plan of Care, and MDM as documented in the resident's/non-physician practitioner's note, except where noted. All available labs and Radiology studies were reviewed.  I was present for key portions of any procedure(s) performed by the resident/non-physician practitioner and I was immediately available to provide assistance.       At this point I agree with the current assessment done in the Emergency Department.  I have conducted an independent evaluation of this patient a history and physical is as follows:    ED Course     49-year-old female presenting to the emergency department for evaluation of chest discomfort and shortness of breath with bilateral hand and feet paresthesias.  Patient states that she is recently changed physicians, and had run out of her clonazepam on 21 November.  Since that time the patient has been having some intermittent paresthesias of her hands and her feet, and went to [facility] last night for the same.  Patient was found to be anemic, and was instructed that likely the paresthesias were secondary to stopping the benzodiazepine.  Patient was on her way to the hospital today for a routine follow-up with her oncologic surgeon, because she had had a total thyroidectomy and November 2019 and because of COVID had not had appropriate follow-up.  Patient states that as the car was coming into [place], she had the onset of chest tightness and shortness of breath.      Past Medical History:   Diagnosis Date    Anemia     Anxiety     Asthma     mild     Cancer (HCC) 2019    Thyroid    Disease of thyroid gland 2019    Removed    GERD (gastroesophageal reflux disease)     Ingrown toenail 02/01/2024    Painful    Palpitations      The patient is resting comfortably on a  stretcher in no acute respiratory distress. The patient appears nontoxic. HEENT reveals moist mucous membranes. Head is normocephalic and atraumatic. Conjunctiva and sclera are normal. Neck is nontender and supple with full range of motion to flexion, extension, lateral rotation. No meningismus appreciated. No masses are appreciated. Lungs are clear to auscultation bilaterally without any wheezes, rales or rhonchi. Heart is regular rate and rhythm without any murmurs, rubs or gallops. Abdomen is soft and nontender without any rebound or guarding. Extremities appear grossly normal without any significant arthropathy. Patient is awake, alert, and oriented x3. The patient has normal interaction.  Cranial nerves grossly intact.  Motor is 5 out of 5 bilateral upper and lower extremities.  The patient appears pale.    MEDICAL DECISION MAKING    Number and Complexity of Problems  Differential diagnosis: Anemia, pneumonia, acute coronary syndrome, MI.    Medical Decision Making Data  External documents reviewed: Reviewed the ED visit from yesterday.  My EKG interpretation: Normal sinus rhythm at 85 bpm.  No acute ST or T wave changes.  My X-ray interpretation: No acute cardiopulmonary disease.      XR chest 1 view portable   ED Interpretation   No acute cardiopulmonary disease.      Final Result      No acute cardiopulmonary disease.            Resident: JACK ARAUZ I, the attending radiologist, have reviewed the images and agree with the final report above.      Workstation performed: [id number]             Labs Reviewed   CBC AND DIFFERENTIAL - Abnormal       Result Value Ref Range Status    WBC 14.35 (*) 4.31 - 10.16 Thousand/uL Final    RBC 4.29  3.81 - 5.12 Million/uL Final    Hemoglobin 7.8 (*) 11.5 - 15.4 g/dL Final    Hematocrit 30.0 (*) 34.8 - 46.1 % Final    MCV 70 (*) 82 - 98 fL Final    MCH 18.2 (*) 26.8 - 34.3 pg Final    MCHC 26.0 (*) 31.4 - 37.4 g/dL Final    Comment: V Results verified by repeat     RDW 23.6 (*) 11.6 - 15.1 % Final    MPV 11.0  8.9 - 12.7 fL Final    Platelets 361  149 - 390 Thousands/uL Final    nRBC 0  /100 WBCs Final    Segmented % 67  43 - 75 % Final    Immature Grans % 1  0 - 2 % Final    Lymphocytes % 19  14 - 44 % Final    Monocytes % 7  4 - 12 % Final    Eosinophils Relative 5  0 - 6 % Final    Basophils Relative 1  0 - 1 % Final    Absolute Neutrophils 9.68 (*) 1.85 - 7.62 Thousands/µL Final    Absolute Immature Grans 0.13  0.00 - 0.20 Thousand/uL Final    Absolute Lymphocytes 2.67  0.60 - 4.47 Thousands/µL Final    Absolute Monocytes 1.00  0.17 - 1.22 Thousand/µL Final    Eosinophils Absolute 0.75 (*) 0.00 - 0.61 Thousand/µL Final    Basophils Absolute 0.12 (*) 0.00 - 0.10 Thousands/µL Final   COMPREHENSIVE METABOLIC PANEL - Abnormal    Sodium 137  135 - 147 mmol/L Final    Potassium 4.3  3.5 - 5.3 mmol/L Final    Comment: Slightly Hemolyzed:Results may be affected.    Chloride 102  96 - 108 mmol/L Final    CO2 26  21 - 32 mmol/L Final    ANION GAP 9  4 - 13 mmol/L Final    BUN 11  5 - 25 mg/dL Final    Creatinine 0.95  0.60 - 1.30 mg/dL Final    Comment: Standardized to IDMS reference method    Glucose 117  65 - 140 mg/dL Final    Comment: If the patient is fasting, the ADA then defines impaired fasting glucose as > 100 mg/dL and diabetes as > or equal to 123 mg/dL.    Calcium 8.9  8.4 - 10.2 mg/dL Final    AST 46 (*) 13 - 39 U/L Final    Comment: Slightly Hemolyzed:Results may be affected.    ALT 9  7 - 52 U/L Final    Comment: Specimen collection should occur prior to Sulfasalazine administration due to the potential for falsely depressed results.     Alkaline Phosphatase 75  34 - 104 U/L Final    Total Protein 7.3  6.4 - 8.4 g/dL Final    Albumin 4.2  3.5 - 5.0 g/dL Final    Total Bilirubin 0.38  0.20 - 1.00 mg/dL Final    Comment: Use of this assay is not recommended for patients undergoing treatment with eltrombopag due to the potential for falsely elevated  "results.  N-acetyl-p-benzoquinone imine (metabolite of Acetaminophen) will generate erroneously low results in samples for patients that have taken an overdose of Acetaminophen.    eGFR 71  ml/min/1.73sq m Final    Narrative:     National Kidney Disease Foundation guidelines for Chronic Kidney Disease (CKD):     Stage 1 with normal or high GFR (GFR > 90 mL/min/1.73 square meters)    Stage 2 Mild CKD (GFR = 60-89 mL/min/1.73 square meters)    Stage 3A Moderate CKD (GFR = 45-59 mL/min/1.73 square meters)    Stage 3B Moderate CKD (GFR = 30-44 mL/min/1.73 square meters)    Stage 4 Severe CKD (GFR = 15-29 mL/min/1.73 square meters)    Stage 5 End Stage CKD (GFR <15 mL/min/1.73 square meters)  Note: GFR calculation is accurate only with a steady state creatinine   TSH, 3RD GENERATION WITH FREE T4 REFLEX - Abnormal    TSH 3RD GENERATON 16.625 (*) 0.450 - 4.500 uIU/mL Final    Comment: The recommended reference ranges for TSH during pregnancy are as follows:   First trimester 0.100 to 2.500 uIU/mL   Second trimester  0.200 to 3.000 uIU/mL   Third trimester 0.300 to 3.000 uIU/m    Note: Normal ranges may not apply to patients who are transgender, non-binary, or whose legal sex, sex at birth, and gender identity differ.  Adult TSH (3rd generation) reference range follows the recommended guidelines of the American Thyroid Association, January, 2020.   HS TROPONIN I 0HR - Normal    hs TnI 0hr <2  \"Refer to ACS Flowchart\"- see link ng/L Final    Comment:                                              Initial (time 0) result  If >=50 ng/L, Myocardial injury suggested ;  Type of myocardial injury and treatment strategy  to be determined.  If 5-49 ng/L, a delta result at 2 hours and or 4 hours will be needed to further evaluate.  If <4 ng/L, and chest pain has been >3 hours since onset, patient may qualify for discharge based on the HEART score in the ED.  If <5 ng/L and <3hours since onset of chest pain, a delta result at 2 hours " "will be needed to further evaluate.    HS Troponin 99th Percentile URL of a Health Population=12 ng/L with a 95% Confidence Interval of 8-18 ng/L.    Second Troponin (time 2 hours)  If calculated delta >= 20 ng/L,  Myocardial injury suggested ; Type of myocardial injury and treatment strategy to be determined.  If 5-49 ng/L and the calculated delta is 5-19 ng/L, consult medical service for evaluation.  Continue evaluation for ischemia on ecg and other possible etiology and repeat hs troponin at 4 hours.  If delta is <5 ng/L at 2 hours, consider discharge based on risk stratification via the HEART score (if in ED), or LANG risk score in IP/Observation.    HS Troponin 99th Percentile URL of a Health Population=12 ng/L with a 95% Confidence Interval of 8-18 ng/L.   T4, FREE - Normal    Free T4 0.90  0.61 - 1.12 ng/dL Final    Comment: Specimens with biotin concentrations > 10 ng/mL can lead to significant (> 10%) positive bias in result.    Narrative:     \"Therapeutic range for patients medicated with thyroid disorders: 0.61-1.24 ng/dL.\"   HS TROPONIN I 2HR    hs TnI 2hr <2  \"Refer to ACS Flowchart\"- see link ng/L Final    Comment:                                              Initial (time 0) result  If >=50 ng/L, Myocardial injury suggested ;  Type of myocardial injury and treatment strategy  to be determined.  If 5-49 ng/L, a delta result at 2 hours and or 4 hours will be needed to further evaluate.  If <4 ng/L, and chest pain has been >3 hours since onset, patient may qualify for discharge based on the HEART score in the ED.  If <5 ng/L and <3hours since onset of chest pain, a delta result at 2 hours will be needed to further evaluate.    HS Troponin 99th Percentile URL of a Health Population=12 ng/L with a 95% Confidence Interval of 8-18 ng/L.    Second Troponin (time 2 hours)  If calculated delta >= 20 ng/L,  Myocardial injury suggested ; Type of myocardial injury and treatment strategy to be determined.  If 5-49 " ng/L and the calculated delta is 5-19 ng/L, consult medical service for evaluation.  Continue evaluation for ischemia on ecg and other possible etiology and repeat hs troponin at 4 hours.  If delta is <5 ng/L at 2 hours, consider discharge based on risk stratification via the HEART score (if in ED), or LANG risk score in IP/Observation.    HS Troponin 99th Percentile URL of a Health Population=12 ng/L with a 95% Confidence Interval of 8-18 ng/L.    Delta 2hr hsTnI     Final    Comment: Unable to Calculate   UA W REFLEX TO MICROSCOPIC WITH REFLEX TO CULTURE   HS TROPONIN I 4HR       Labs reviewed by me are significant for:  Elevated TSH with normal T4.  Delta troponin 0.    Treatment and Disposition  ED course: Patient remained hemodynamically stable in the emergency department.  Cardiac evaluation was unremarkable.  Likely symptoms are secondary to anxiety.  Patient has a scheduled follow-up with her primary care physician for a first-time visit at 1:30 PM today.  Shared decision making: Patient agrees with following up with primary.  Code status: Full code.                Critical Care Time  Procedures

## 2024-12-09 NOTE — ED PROVIDER NOTES
Time reflects when diagnosis was documented in both MDM as applicable and the Disposition within this note       Time User Action Codes Description Comment    12/6/2024 11:40 AM Karen Cooper Add [R07.89] Chest tightness     12/6/2024 11:44 AM Karen Cooper Add [E03.9] Hypothyroidism     12/6/2024 11:46 AM Karen Cooper Remove [E03.9] Hypothyroidism     12/6/2024 11:46 AM Karen Cooper Add [E89.0] H/O total thyroidectomy           ED Disposition       ED Disposition   Discharge    Condition   Stable    Date/Time   Fri Dec 6, 2024 11:40 AM    Comment   Dionne Ford discharge to home/self care.                   Assessment & Plan       Medical Decision Making  Dionne Ford is a 49 y.o. who presents with complaints of chest discomfort, SOB    Vital signs are HD stable, afebrile     Ddx: Anemia, pneumonia, acute coronary syndrome, MI.     Plan: EKG WNL  CXR negative for acute cardiopulmonary abnormalities   Blood re demonstrating leukocytosis and anemia, otherwise WNL  Symptoms resolved spontaneously in the ED  Recommend follow up with PCP as scheduled for today at 1 pm, and rescheduling surgical oncology appt  Supportive care instructions provided     Disposition: Patient stable for discharge. Return precautions provided. Patient understands and is agreeable to plan.         Amount and/or Complexity of Data Reviewed  Labs: ordered.  Radiology: independent interpretation performed.             Medications - No data to display    ED Risk Strat Scores   HEART Risk Score      Flowsheet Row Most Recent Value   Heart Score Risk Calculator    History 0 Filed at: 12/09/2024 1838   ECG 0 Filed at: 12/09/2024 1838   Age 1 Filed at: 12/09/2024 1838   Risk Factors 0 Filed at: 12/09/2024 1838   Troponin 0 Filed at: 12/09/2024 1838   HEART Score 1 Filed at: 12/09/2024 1838                 CRAFFT      Flowsheet Row Most Recent Value   CRADOMIT Initial Screen: During the past 12 months, did you:  "   1. Drink any alcohol (more than a few sips)?  No Filed at: 2024   2. Smoke any marijuana or hashish No Filed at: 2024   3. Use anything else to get high? (\"anything else\" includes illegal drugs, over the counter and prescription drugs, and things that you sniff or 'tapia')? No Filed at: 2024                      SBIRT 22yo+      Flowsheet Row Most Recent Value   Initial Alcohol Screen: US AUDIT-C     1. How often do you have a drink containing alcohol? 0 Filed at: 2024   2. How many drinks containing alcohol do you have on a typical day you are drinking?  0 Filed at: 2024   3a. Male UNDER 65: How often do you have five or more drinks on one occasion? 0 Filed at: 2024   3b. FEMALE Any Age, or MALE 65+: How often do you have 4 or more drinks on one occassion? 0 Filed at: 2024   Audit-C Score 0 Filed at: 2024   JAGJIT: How many times in the past year have you...    Used an illegal drug or used a prescription medication for non-medical reasons? Never Filed at: 2024                            History of Present Illness       Chief Complaint   Patient presents with    Chest Pain     Pt reports chest pressure and SOB that started 5 minutes PTA. Pt reports being anemic and was supposed to go to Doctors appointment and came here. Was seen at  ED last night.       Past Medical History:   Diagnosis Date    Anemia     Anxiety     Asthma     mild     Cancer (HCC) 2019    Thyroid    Disease of thyroid gland 2019    Removed    GERD (gastroesophageal reflux disease)     Ingrown toenail 2024    Painful    Palpitations       Past Surgical History:   Procedure Laterality Date    APPENDECTOMY      AUGMENTATION MAMMAPLASTY       SECTION      DENTAL SURGERY      THYROIDECTOMY Bilateral 2019    Procedure: TOTAL THYROIDECTOMY;  Surgeon: Greg Corona MD;  Location: [facility];  Service: Surgical Oncology    US GUIDED " THYROID BIOPSY  10/8/2019      Family History   Problem Relation Age of Onset    Heart disease Mother     Hypertension Mother     Heart disease Paternal Grandfather       Social History     Tobacco Use    Smoking status: Every Day     Current packs/day: 1.50     Average packs/day: 1.5 packs/day for 20.0 years (30.0 ttl pk-yrs)     Types: Cigarettes    Smokeless tobacco: Never   Vaping Use    Vaping status: Never Used   Substance Use Topics    Alcohol use: Not Currently     Comment: Social    Drug use: No      E-Cigarette/Vaping    E-Cigarette Use Never User       E-Cigarette/Vaping Substances    Nicotine No     THC No     CBD No     Flavoring No     Other No     Unknown No       I have reviewed and agree with the history as documented.     Patient is a 48 yo female who presents for evaluation of chest pressure and shortness of breath, as well as paraesthesias in hands and feet. Patient states she was going to her surgical oncology appointment this morning for follow up s/p total thyroidectomy in 2019 but was lost to follow up due to Covid. She started experiencing symptoms in the car approx. 5 minutes PTA to her appointment and came tot he ED instead. Per chart review, she was seen at [facility] last night for the parasthesias, which have been going on since November, and are possibly due to stopping clonazepam. Patient was also found to be anemic. She was recommended to undergo US of uterus to evaluate for fibroids. She was also recommended to follow up with her PCP and surgical oncologist today as scheduled. She smokes approx. 1 cigarette daily. Denies chest pain or SOB prior to this morning, cough, fever, chills, abdominal pain, nausea, or vomiting.         Review of Systems   All other systems reviewed and are negative.          Objective       ED Triage Vitals [12/06/24 0810]   Temperature Pulse Blood Pressure Respirations SpO2 Patient Position - Orthostatic VS   (!) 97.1 °F (36.2 °C) 91 122/56 20  100 % Sitting      Temp Source Heart Rate Source BP Location FiO2 (%) Pain Score    Temporal Monitor Left arm -- 4      Vitals      Date and Time Temp Pulse SpO2 Resp BP Pain Score FACES Pain Rating User   12/06/24 1130 -- 76 99 % 14 130/69 -- -- AK   12/06/24 1100 -- 76 96 % 16 117/68 2 -- KRR   12/06/24 1030 -- 78 98 % 23 122/65 2 -- KRR   12/06/24 1000 -- 78 98 % 18 120/67 2 -- KRR   12/06/24 0930 -- 74 99 % 14 113/55 2 -- KRR   12/06/24 0900 -- 80 100 % 16 123/68 2 -- KRR   12/06/24 0810 97.1 °F (36.2 °C) 91 100 % 20 122/56 4 -- CS            Physical Exam  Constitutional:       General: She is not in acute distress.     Appearance: She is obese. She is not ill-appearing, toxic-appearing or diaphoretic.   HENT:      Head: Normocephalic and atraumatic.      Nose: Nose normal.      Mouth/Throat:      Mouth: Mucous membranes are moist.      Pharynx: Oropharynx is clear.   Eyes:      Extraocular Movements: Extraocular movements intact.      Conjunctiva/sclera: Conjunctivae normal.   Cardiovascular:      Rate and Rhythm: Normal rate and regular rhythm.      Pulses: Normal pulses.      Heart sounds: Normal heart sounds.   Pulmonary:      Effort: Pulmonary effort is normal. No respiratory distress.      Breath sounds: Normal breath sounds.      Comments: No conversational dyspnea   Abdominal:      General: Abdomen is flat. There is no distension.      Palpations: Abdomen is soft.      Tenderness: There is no abdominal tenderness. There is no guarding or rebound.   Musculoskeletal:         General: No swelling. Normal range of motion.      Cervical back: Normal range of motion and neck supple.      Right lower leg: No edema.      Left lower leg: No edema.   Skin:     General: Skin is warm and dry.      Capillary Refill: Capillary refill takes less than 2 seconds.   Neurological:      General: No focal deficit present.      Mental Status: She is alert and oriented to person, place, and time.   Psychiatric:         Mood  "and Affect: Mood normal.         Behavior: Behavior normal.         Results Reviewed       Procedure Component Value Units Date/Time    HS Troponin I 2hr [[id number]] Collected: 12/06/24 1032    Lab Status: Final result Specimen: Blood from Arm, Left Updated: 12/06/24 1104     hs TnI 2hr <2 ng/L      Delta 2hr hsTnI --    T4, free [[id number]]  (Normal) Collected: 12/06/24 0835    Lab Status: Final result Specimen: Blood from Arm, Left Updated: 12/06/24 1054     Free T4 0.90 ng/dL     Narrative:        \"Therapeutic range for patients medicated with thyroid disorders: 0.61-1.24 ng/dL.\"    TSH, 3rd generation with Free T4 reflex [[id number]]  (Abnormal) Collected: 12/06/24 0835    Lab Status: Final result Specimen: Blood from Arm, Left Updated: 12/06/24 1017     TSH 3RD GENERATON 16.625 uIU/mL     HS Troponin 0hr (reflex protocol) [[id number]]  (Normal) Collected: 12/06/24 0835    Lab Status: Final result Specimen: Blood from Arm, Left Updated: 12/06/24 0906     hs TnI 0hr <2 ng/L     Comprehensive metabolic panel [[id number]]  (Abnormal) Collected: 12/06/24 0835    Lab Status: Final result Specimen: Blood from Arm, Left Updated: 12/06/24 0903     Sodium 137 mmol/L      Potassium 4.3 mmol/L      Chloride 102 mmol/L      CO2 26 mmol/L      ANION GAP 9 mmol/L      BUN 11 mg/dL      Creatinine 0.95 mg/dL      Glucose 117 mg/dL      Calcium 8.9 mg/dL      AST 46 U/L      ALT 9 U/L      Alkaline Phosphatase 75 U/L      Total Protein 7.3 g/dL      Albumin 4.2 g/dL      Total Bilirubin 0.38 mg/dL      eGFR 71 ml/min/1.73sq m     Narrative:      National Kidney Disease Foundation guidelines for Chronic Kidney Disease (CKD):     Stage 1 with normal or high GFR (GFR > 90 mL/min/1.73 square meters)    Stage 2 Mild CKD (GFR = 60-89 mL/min/1.73 square meters)    Stage 3A Moderate CKD (GFR = 45-59 mL/min/1.73 square meters)    Stage 3B Moderate CKD (GFR = 30-44 mL/min/1.73 square meters)    Stage 4 Severe CKD (GFR = 15-29 mL/min/1.73 " square meters)    Stage 5 End Stage CKD (GFR <15 mL/min/1.73 square meters)  Note: GFR calculation is accurate only with a steady state creatinine    CBC and differential [[id number]]  (Abnormal) Collected: 12/06/24 0835    Lab Status: Final result Specimen: Blood from Arm, Left Updated: 12/06/24 0854     WBC 14.35 Thousand/uL      RBC 4.29 Million/uL      Hemoglobin 7.8 g/dL      Hematocrit 30.0 %      MCV 70 fL      MCH 18.2 pg      MCHC 26.0 g/dL      RDW 23.6 %      MPV 11.0 fL      Platelets 361 Thousands/uL      nRBC 0 /100 WBCs      Segmented % 67 %      Immature Grans % 1 %      Lymphocytes % 19 %      Monocytes % 7 %      Eosinophils Relative 5 %      Basophils Relative 1 %      Absolute Neutrophils 9.68 Thousands/µL      Absolute Immature Grans 0.13 Thousand/uL      Absolute Lymphocytes 2.67 Thousands/µL      Absolute Monocytes 1.00 Thousand/µL      Eosinophils Absolute 0.75 Thousand/µL      Basophils Absolute 0.12 Thousands/µL             XR chest 1 view portable   ED Interpretation by Floyd Yeager MD (12/06 0944)   No acute cardiopulmonary disease.      Final Interpretation by Javid العراقي MD (12/06 1111)      No acute cardiopulmonary disease.            Resident: JACK ARAUZ I, the attending radiologist, have reviewed the images and agree with the final report above.      Workstation performed: [id number]             ECG 12 Lead Documentation Only    Date/Time: 12/9/2024 6:24 PM    Performed by: Karen Cooper MD  Authorized by: Karen Cooper MD    Indications / Diagnosis:  Chest tightness, SOB  ECG reviewed by me, the ED Provider: yes    Patient location:  ED  Previous ECG:     Previous ECG:  Compared to current    Similarity:  No change  Interpretation:     Interpretation: normal    Rate:     ECG rate:  85    ECG rate assessment: normal    Rhythm:     Rhythm: sinus rhythm    Ectopy:     Ectopy: none    QRS:     QRS axis:  Normal    QRS intervals:  Normal  Conduction:      Conduction: normal    ST segments:     ST segments:  Normal  T waves:     T waves: normal        ED Medication and Procedure Management   Prior to Admission Medications   Prescriptions Last Dose Informant Patient Reported? Taking?   FLUoxetine (PROzac) 40 MG capsule   No No   Sig: TAKE 1 CAPSULE BY MOUTH EVERY DAY   Patient not taking: Reported on 11/25/2024   LORazepam (ATIVAN) 0.5 mg tablet   No No   Sig: Take 1 or 2 tablets every 6 hours as needed for anxiety   PARoxetine (PAXIL) 10 mg tablet   No No   Sig: TAKE 1 TABLET BY MOUTH EVERY DAY   Patient not taking: Reported on 11/25/2024   Synthroid 112 MCG tablet   No No   Sig: Take 1 tablet (112 mcg total) by mouth daily   busPIRone (BUSPAR) 5 mg tablet   No No   Sig: Take 1 tablet (5 mg total) by mouth 2 (two) times a day   Patient not taking: Reported on 11/25/2024   butalbital-acetaminophen-caffeine (FIORICET,ESGIC) -40 mg per tablet   No No   Sig: Take 1 tablet by mouth every 4 (four) hours as needed for headaches   Patient not taking: Reported on 11/25/2024   clomiPRAMINE (ANAFRANIL) 25 mg capsule   No No   Sig: Take 1 capsule (25 mg total) by mouth daily at bedtime   Patient not taking: Reported on 11/25/2024   clonazePAM (KlonoPIN) 1 mg tablet   No No   Sig: TAKE 1 TABLET BY MOUTH DAILY AT BEDTIME   Patient not taking: Reported on 11/25/2024   famotidine (PEPCID) 10 mg tablet  Self Yes No   Sig: Take 10 mg by mouth 2 (two) times a day   Patient not taking: Reported on 12/2/2022   furosemide (LASIX) 20 mg tablet   No No   Sig: Take 1 tablet (20 mg total) by mouth daily   Patient not taking: Reported on 11/25/2024   predniSONE 10 mg tablet   No No   Sig: Take 4 tablets today, then three tablets daily for 2 days, two tablets daily for 2 days, then one tablet daily for 2 days.   Patient not taking: Reported on 11/25/2024   propranolol (INDERAL) 10 mg tablet   No No   Sig: TAKE 2 TABLETS BY MOUTH FOUR TIMES A DAY      Facility-Administered Medications: None      Discharge Medication List as of 12/6/2024 11:46 AM        CONTINUE these medications which have NOT CHANGED    Details   busPIRone (BUSPAR) 5 mg tablet Take 1 tablet (5 mg total) by mouth 2 (two) times a day, Starting Fri 12/2/2022, Normal      butalbital-acetaminophen-caffeine (FIORICET,ESGIC) -40 mg per tablet Take 1 tablet by mouth every 4 (four) hours as needed for headaches, Starting Mon 7/18/2022, Normal      clomiPRAMINE (ANAFRANIL) 25 mg capsule Take 1 capsule (25 mg total) by mouth daily at bedtime, Starting Mon 4/4/2022, Normal      clonazePAM (KlonoPIN) 1 mg tablet TAKE 1 TABLET BY MOUTH DAILY AT BEDTIME, Starting Wed 8/21/2024, Normal      famotidine (PEPCID) 10 mg tablet Take 10 mg by mouth 2 (two) times a day, Historical Med      FLUoxetine (PROzac) 40 MG capsule TAKE 1 CAPSULE BY MOUTH EVERY DAY, Normal      furosemide (LASIX) 20 mg tablet Take 1 tablet (20 mg total) by mouth daily, Starting Wed 3/23/2022, Normal      LORazepam (ATIVAN) 0.5 mg tablet Take 1 or 2 tablets every 6 hours as needed for anxiety, Normal      PARoxetine (PAXIL) 10 mg tablet TAKE 1 TABLET BY MOUTH EVERY DAY, Normal      predniSONE 10 mg tablet Take 4 tablets today, then three tablets daily for 2 days, two tablets daily for 2 days, then one tablet daily for 2 days., Normal      propranolol (INDERAL) 10 mg tablet TAKE 2 TABLETS BY MOUTH FOUR TIMES A DAY, Starting Wed 8/21/2024, Normal      Synthroid 112 MCG tablet Take 1 tablet (112 mcg total) by mouth daily, Starting Mon 11/25/2024, Normal           No discharge procedures on file.  ED SEPSIS DOCUMENTATION   Time reflects when diagnosis was documented in both MDM as applicable and the Disposition within this note       Time User Action Codes Description Comment    12/6/2024 11:40 AM Karen Cooper [R07.89] Chest tightness     12/6/2024 11:44 AM Karen Cooper Add [E03.9] Hypothyroidism     12/6/2024 11:46 AM Karen Cooper Remove [E03.9] Hypothyroidism      12/6/2024 11:46 AM Karen Cooper Add [E89.0] H/O total thyroidectomy                  Karen Cooper MD  12/09/24 1830       Floyd Yeager MD  12/09/24 2494

## 2024-12-15 ENCOUNTER — APPOINTMENT (EMERGENCY)
Dept: CT IMAGING | Facility: HOSPITAL | Age: 49
End: 2024-12-15
Payer: COMMERCIAL

## 2024-12-15 ENCOUNTER — HOSPITAL ENCOUNTER (EMERGENCY)
Facility: HOSPITAL | Age: 49
Discharge: LEFT BEFORE DISCHARGE | End: 2024-12-15
Attending: EMERGENCY MEDICINE
Payer: COMMERCIAL

## 2024-12-15 ENCOUNTER — APPOINTMENT (EMERGENCY)
Dept: RADIOLOGY | Facility: HOSPITAL | Age: 49
End: 2024-12-15
Payer: COMMERCIAL

## 2024-12-15 VITALS
OXYGEN SATURATION: 99 % | SYSTOLIC BLOOD PRESSURE: 126 MMHG | TEMPERATURE: 98.3 F | DIASTOLIC BLOOD PRESSURE: 78 MMHG | BODY MASS INDEX: 37.62 KG/M2 | HEIGHT: 69 IN | RESPIRATION RATE: 19 BRPM | HEART RATE: 75 BPM | WEIGHT: 254 LBS

## 2024-12-15 DIAGNOSIS — R20.2 PARESTHESIA: Primary | ICD-10-CM

## 2024-12-15 DIAGNOSIS — R07.9 CHEST PAIN: ICD-10-CM

## 2024-12-15 LAB
ALBUMIN SERPL BCG-MCNC: 4.4 G/DL (ref 3.5–5)
ALP SERPL-CCNC: 76 U/L (ref 34–104)
ALT SERPL W P-5'-P-CCNC: 8 U/L (ref 7–52)
ANION GAP SERPL CALCULATED.3IONS-SCNC: 6 MMOL/L (ref 4–13)
AST SERPL W P-5'-P-CCNC: 14 U/L (ref 13–39)
BASOPHILS # BLD AUTO: 0.1 THOUSANDS/ÂΜL (ref 0–0.1)
BASOPHILS NFR BLD AUTO: 1 % (ref 0–1)
BILIRUB SERPL-MCNC: 0.39 MG/DL (ref 0.2–1)
BUN SERPL-MCNC: 9 MG/DL (ref 5–25)
CA-I BLD-SCNC: 1.07 MMOL/L (ref 1.12–1.32)
CALCIUM SERPL-MCNC: 8.6 MG/DL (ref 8.4–10.2)
CARDIAC TROPONIN I PNL SERPL HS: <2 NG/L (ref ?–50)
CARDIAC TROPONIN I PNL SERPL HS: <2 NG/L (ref ?–50)
CHLORIDE SERPL-SCNC: 105 MMOL/L (ref 96–108)
CK SERPL-CCNC: 36 U/L (ref 26–192)
CO2 SERPL-SCNC: 25 MMOL/L (ref 21–32)
CREAT SERPL-MCNC: 0.89 MG/DL (ref 0.6–1.3)
EOSINOPHIL # BLD AUTO: 0.68 THOUSAND/ÂΜL (ref 0–0.61)
EOSINOPHIL NFR BLD AUTO: 6 % (ref 0–6)
ERYTHROCYTE [DISTWIDTH] IN BLOOD BY AUTOMATED COUNT: 30.8 % (ref 11.6–15.1)
EXT PREGNANCY TEST URINE: NEGATIVE
EXT. CONTROL: NORMAL
GFR SERPL CREATININE-BSD FRML MDRD: 76 ML/MIN/1.73SQ M
GLUCOSE SERPL-MCNC: 108 MG/DL (ref 65–140)
HCT VFR BLD AUTO: 34.4 % (ref 34.8–46.1)
HGB BLD-MCNC: 9.3 G/DL (ref 11.5–15.4)
IMM GRANULOCYTES # BLD AUTO: 0.06 THOUSAND/UL (ref 0–0.2)
IMM GRANULOCYTES NFR BLD AUTO: 1 % (ref 0–2)
LYMPHOCYTES # BLD AUTO: 1.71 THOUSANDS/ÂΜL (ref 0.6–4.47)
LYMPHOCYTES NFR BLD AUTO: 14 % (ref 14–44)
MAGNESIUM SERPL-MCNC: 2.1 MG/DL (ref 1.9–2.7)
MCH RBC QN AUTO: 20.9 PG (ref 26.8–34.3)
MCHC RBC AUTO-ENTMCNC: 27 G/DL (ref 31.4–37.4)
MCV RBC AUTO: 78 FL (ref 82–98)
MONOCYTES # BLD AUTO: 0.94 THOUSAND/ÂΜL (ref 0.17–1.22)
MONOCYTES NFR BLD AUTO: 8 % (ref 4–12)
NEUTROPHILS # BLD AUTO: 8.9 THOUSANDS/ÂΜL (ref 1.85–7.62)
NEUTS SEG NFR BLD AUTO: 70 % (ref 43–75)
NRBC BLD AUTO-RTO: 0 /100 WBCS
PLATELET # BLD AUTO: 270 THOUSANDS/UL (ref 149–390)
PMV BLD AUTO: 10.5 FL (ref 8.9–12.7)
POTASSIUM SERPL-SCNC: 4.1 MMOL/L (ref 3.5–5.3)
PROT SERPL-MCNC: 7.3 G/DL (ref 6.4–8.4)
RBC # BLD AUTO: 4.44 MILLION/UL (ref 3.81–5.12)
SODIUM SERPL-SCNC: 136 MMOL/L (ref 135–147)
TSH SERPL DL<=0.05 MIU/L-ACNC: 3.35 UIU/ML (ref 0.45–4.5)
WBC # BLD AUTO: 12.39 THOUSAND/UL (ref 4.31–10.16)

## 2024-12-15 PROCEDURE — 99285 EMERGENCY DEPT VISIT HI MDM: CPT

## 2024-12-15 PROCEDURE — 71045 X-RAY EXAM CHEST 1 VIEW: CPT

## 2024-12-15 PROCEDURE — 84443 ASSAY THYROID STIM HORMONE: CPT

## 2024-12-15 PROCEDURE — 82550 ASSAY OF CK (CPK): CPT

## 2024-12-15 PROCEDURE — 70498 CT ANGIOGRAPHY NECK: CPT

## 2024-12-15 PROCEDURE — 83735 ASSAY OF MAGNESIUM: CPT

## 2024-12-15 PROCEDURE — 80053 COMPREHEN METABOLIC PANEL: CPT

## 2024-12-15 PROCEDURE — 70496 CT ANGIOGRAPHY HEAD: CPT

## 2024-12-15 PROCEDURE — 93005 ELECTROCARDIOGRAM TRACING: CPT

## 2024-12-15 PROCEDURE — 84484 ASSAY OF TROPONIN QUANT: CPT

## 2024-12-15 PROCEDURE — 85025 COMPLETE CBC W/AUTO DIFF WBC: CPT

## 2024-12-15 PROCEDURE — 81025 URINE PREGNANCY TEST: CPT

## 2024-12-15 PROCEDURE — 82330 ASSAY OF CALCIUM: CPT

## 2024-12-15 PROCEDURE — 36415 COLL VENOUS BLD VENIPUNCTURE: CPT

## 2024-12-15 RX ORDER — SODIUM CHLORIDE 9 MG/ML
3 INJECTION INTRAVENOUS
Status: DISCONTINUED | OUTPATIENT
Start: 2024-12-15 | End: 2024-12-15 | Stop reason: HOSPADM

## 2024-12-15 RX ADMIN — IOHEXOL 85 ML: 350 INJECTION, SOLUTION INTRAVENOUS at 16:22

## 2024-12-15 NOTE — ED CARE HANDOFF
Emergency Department Sign Out Note        Sign out and transfer of care from Lisseth Kyle. See Separate Emergency Department note.     The patient, Dionne Ford, was evaluated by the previous provider for generalized weakness, numbness.    Workup Completed:  Labs and CT scan performed, pending CT scan results.    ED Course / Workup Pending (followup):  Patient eloped from ED prior to CT scan results being available or r being evaluated by myself.      HEART Risk Score      Flowsheet Row Most Recent Value   Heart Score Risk Calculator    History 0 Filed at: 12/15/2024 1755   ECG 0 Filed at: 12/15/2024 1755   Age 1 Filed at: 12/15/2024 1755   Risk Factors 1 Filed at: 12/15/2024 1755   Troponin 0 Filed at: 12/15/2024 1755   HEART Score 2 Filed at: 12/15/2024 1755                  PERC Rule for PE      Flowsheet Row Most Recent Value   PERC Rule for PE    Age >=50 0 Filed at: 12/15/2024 1422   HR >=100 0 Filed at: 12/15/2024 1422   O2 Sat on room air < 95% 0 Filed at: 12/15/2024 1422   History of PE or DVT 0 Filed at: 12/15/2024 1422   Recent trauma or surgery 0 Filed at: 12/15/2024 1422   Hemoptysis 0 Filed at: 12/15/2024 1422   Exogenous estrogen 0 Filed at: 12/15/2024 1422   Unilateral leg swelling 0 Filed at: 12/15/2024 1422   PERC Rule for PE Results 0 Filed at: 12/15/2024 1422                          ED Course as of 12/15/24 1849   Sun Dec 15, 2024   1847 Was informed by ED tech that patient was requesting to have her IV removed because she wanted to leave ED right away.  CT imaging not yet read by radiology.  Patient eloped from ED prior to being evaluated by myself or results of CT imaging being back.     Procedures  Medical Decision Making          Disposition  Final diagnoses:   Paresthesia   Chest pain     Time reflects when diagnosis was documented in both MDM as applicable and the Disposition within this note       Time User Action Codes Description Comment    12/15/2024  5:57 PM Saroj  Lisseth MCDOWELL Add [R20.2] Paresthesia     12/15/2024  5:57 PM BrayanjuliairmaLisseth JULY Add [R07.9] Chest pain           ED Disposition       ED Disposition   Left from Room after Provider Exam    Condition   --    Date/Time   Sun Dec 15, 2024 3419    Comment   --             Follow-up Information       Follow up With Specialties Details Why Contact Info Additional Information    [facility]   [address]  [address]  [facility] [address]  [phone] [facility], [address], [place], [place], [address]   [phone]    [facility]   [address]  [address]  [facility] [address] [phone] [facility], [address], [place], [place], [address] [phone]    Ashley Hein MD Family Medicine   [address] [place] [address]  [phone]        [facility] Emergency Medicine   [address] [address] [phone] [facility], [address] [place], [place] [address]          Discharge Medication List as of 12/15/2024  6:49 PM        CONTINUE these medications which have NOT CHANGED    Details   busPIRone (BUSPAR) 5 mg tablet Take 1 tablet (5 mg total) by mouth 2 (two) times a day, Starting Fri 12/2/2022, Normal      butalbital-acetaminophen-caffeine (FIORICET,ESGIC) -40 mg per tablet Take 1 tablet by mouth every 4 (four) hours as needed for headaches, Starting Mon 7/18/2022, Normal      clomiPRAMINE (ANAFRANIL) 25 mg capsule Take 1 capsule (25 mg total) by mouth daily at bedtime, Starting Mon 4/4/2022, Normal      clonazePAM (KlonoPIN) 1 mg tablet TAKE 1 TABLET BY MOUTH DAILY AT BEDTIME, Starting Wed 8/21/2024, Normal      famotidine (PEPCID) 10 mg tablet Take 10 mg  by mouth 2 (two) times a day, Historical Med      FLUoxetine (PROzac) 40 MG capsule TAKE 1 CAPSULE BY MOUTH EVERY DAY, Normal      furosemide (LASIX) 20 mg tablet Take 1 tablet (20 mg total) by mouth daily, Starting Wed 3/23/2022, Normal      LORazepam (ATIVAN) 0.5 mg tablet Take 1 or 2 tablets every 6 hours as needed for anxiety, Normal      PARoxetine (PAXIL) 10 mg tablet TAKE 1 TABLET BY MOUTH EVERY DAY, Normal      predniSONE 10 mg tablet Take 4 tablets today, then three tablets daily for 2 days, two tablets daily for 2 days, then one tablet daily for 2 days., Normal      propranolol (INDERAL) 10 mg tablet TAKE 2 TABLETS BY MOUTH FOUR TIMES A DAY, Starting Wed 8/21/2024, Normal      Synthroid 112 MCG tablet Take 1 tablet (112 mcg total) by mouth daily, Starting Mon 11/25/2024, Normal                  ED Provider  Electronically Signed by     Anthony Crowe MD  12/15/24 6920       Anthony Crowe MD  12/15/24 8064

## 2024-12-15 NOTE — ED NOTES
Pt on call bell; requesting IV be taken out and stating she wants to go home. Provider notified.     Sindhu Ta RN  12/15/24 2166

## 2024-12-15 NOTE — ED PROVIDER NOTES
Time reflects when diagnosis was documented in both MDM as applicable and the Disposition within this note       Time User Action Codes Description Comment    12/15/2024  5:57 PM Lisseth Kyle Add [R20.2] Paresthesia     12/15/2024  5:57 PM Lisseth Kyle Add [R07.9] Chest pain           ED Disposition       None          Assessment & Plan       Medical Decision Making  Differential diagnosis including anemia, electrolyte abnormality, CVA, dysrhythmia  Patient reporting right hand tingling, right foot tingling since Friday that has been intermittent. Patient states different than prior documented paresthesias tenderness for body.  Patient reports that initially this morning when she woke up, 11:30 she had an episode of tingling however it is improving.  Patient reports multiple episodes of this since Friday.  Will check CTA head and neck, EKG, chest x-ray  Pulmonary: Patient's anemia is resolving.  Did discuss with patient that.  It just ended just prior to her blood work being taken.  Patient reports history of heavy menses.  Patient has a remaining leukocytosis.  No EMILIANO, no gross electrolyte abnormality.  Troponin of less than 2.  As patient's chest discomfort has been ongoing for few days, no further treatment required. Heart score of 2. Patient appropriate for follow up with cardiology.   Discussed patient with Dr. Aguilera recommendation for MRI that can be completed in the outpatient setting if patient feel comfortable with plan.   Awaiting CT h/n results when signed out to Dr. Crowe. Aware plan would be for MRI in outpatient setting.      Amount and/or Complexity of Data Reviewed  Labs: ordered.  Radiology: ordered.    Risk  Prescription drug management.             Medications   sodium chloride (PF) 0.9 % injection 3 mL (has no administration in time range)   iohexol (OMNIPAQUE) 350 MG/ML injection (MULTI-DOSE) 100 mL (85 mL Intravenous Given 12/15/24 1622)       ED Risk Strat Scores   HEART Risk  Score      Flowsheet Row Most Recent Value   Heart Score Risk Calculator    History 0 Filed at: 12/15/2024 1755   ECG 0 Filed at: 12/15/2024 1755   Age 1 Filed at: 12/15/2024 1755   Risk Factors 1 Filed at: 12/15/2024 1755   Troponin 0 Filed at: 12/15/2024 1755   HEART Score 2 Filed at: 12/15/2024 1755          HEART Risk Score      Flowsheet Row Most Recent Value   Heart Score Risk Calculator    History 0 Filed at: 12/15/2024 1755   ECG 0 Filed at: 12/15/2024 1755   Age 1 Filed at: 12/15/2024 1755   Risk Factors 1 Filed at: 12/15/2024 1755   Troponin 0 Filed at: 12/15/2024 1755   HEART Score 2 Filed at: 12/15/2024 1755                        PERC Rule for PE      Flowsheet Row Most Recent Value   PERC Rule for PE    Age >=50 0 Filed at: 12/15/2024 1422   HR >=100 0 Filed at: 12/15/2024 1422   O2 Sat on room air < 95% 0 Filed at: 12/15/2024 1422   History of PE or DVT 0 Filed at: 12/15/2024 1422   Recent trauma or surgery 0 Filed at: 12/15/2024 1422   Hemoptysis 0 Filed at: 12/15/2024 1422   Exogenous estrogen 0 Filed at: 12/15/2024 1422   Unilateral leg swelling 0 Filed at: 12/15/2024 1422   PERC Rule for PE Results 0 Filed at: 12/15/2024 1422            SBIRT 20yo+      Flowsheet Row Most Recent Value   Initial Alcohol Screen: US AUDIT-C     1. How often do you have a drink containing alcohol? 0 Filed at: 12/15/2024 1418   2. How many drinks containing alcohol do you have on a typical day you are drinking?  0 Filed at: 12/15/2024 1418   3a. Male UNDER 65: How often do you have five or more drinks on one occasion? 0 Filed at: 12/15/2024 1418   3b. FEMALE Any Age, or MALE 65+: How often do you have 4 or more drinks on one occassion? 0 Filed at: 12/15/2024 1418   Audit-C Score 0 Filed at: 12/15/2024 1418   JAGJIT: How many times in the past year have you...    Used an illegal drug or used a prescription medication for non-medical reasons? Never Filed at: 12/15/2024 1418                            History of Present  Illness       Chief Complaint   Patient presents with    Weakness - Generalized     Pt states that she was just dx with anemia. Pt states that she just started taking iron. Pt states that she has been feeling weak, numbness in arms       Past Medical History:   Diagnosis Date    Anemia     Anxiety     Asthma     mild     Cancer (HCC) 2019    Thyroid    Disease of thyroid gland 2019    Removed    GERD (gastroesophageal reflux disease)     Ingrown toenail 2024    Painful    Palpitations       Past Surgical History:   Procedure Laterality Date    APPENDECTOMY      AUGMENTATION MAMMAPLASTY       SECTION      DENTAL SURGERY      THYROIDECTOMY Bilateral 2019    Procedure: TOTAL THYROIDECTOMY;  Surgeon: Greg Corona MD;  Location: [facility];  Service: Surgical Oncology    US GUIDED THYROID BIOPSY  10/8/2019      Family History   Problem Relation Age of Onset    Heart disease Mother     Hypertension Mother     Heart disease Paternal Grandfather       Social History     Tobacco Use    Smoking status: Every Day     Current packs/day: 1.50     Average packs/day: 1.5 packs/day for 20.0 years (30.0 ttl pk-yrs)     Types: Cigarettes    Smokeless tobacco: Never   Vaping Use    Vaping status: Never Used   Substance Use Topics    Alcohol use: Not Currently     Comment: Social    Drug use: No      E-Cigarette/Vaping    E-Cigarette Use Never User       E-Cigarette/Vaping Substances    Nicotine No     THC No     CBD No     Flavoring No     Other No     Unknown No       I have reviewed and agree with the history as documented.     Patient is a 41-year-old female past medical history of hypothyroidism, anemia arriving for evaluation of numbness and tingling.  Patient reports that today is different than the prior most recent evaluations in the ED.  Patient states that she did not have a syncopal episode.  Patient states that she is not have general body tingling, states that she is having isolated hand tingling to  her right hand and her right foot.  Patient states that these symptoms come and go sometimes they come on together.  Patient reports that this has been going on since Friday.  Patient states that she had generalized body paresthesias before but this is different today.  Patient reports that she has also had intermittent weakness of her right arm and right leg.  Patient states that she will be holding her phone and drop it.  Patient reports that she feels as though she did too much exercising.  Patient reports that she has a warm sensation in her chest intermittently as well.  Patient denies any shortness of breath, difficulty breathing.  Patient denies any headache.  Patient reports bilateral blurred vision last week but none currently.  Patient has been evaluated multiple times in the ED diagnosed with anemia, was attributed to heavy menses.  Patient called her physician today and was told to come to the ER for evaluation.  Patient can describe that it feels like her entire hand is numb and tingly, like a glove is over it, as well as like her entire foot is numb and tingly like a sock is over. States she was told she may have intermittent paresthesias that she stopped her Klonopin, and can happen up to 8 weeks.  Last Klonopin dose was the week of Thanksgiving.        Review of Systems   Constitutional: Negative.    HENT: Negative.     Eyes: Negative.    Respiratory: Negative.     Cardiovascular: Negative.    Gastrointestinal: Negative.    Endocrine: Negative.    Genitourinary: Negative.    Musculoskeletal: Negative.    Skin: Negative.    Allergic/Immunologic: Negative.    Neurological:  Positive for numbness. Negative for headaches.   Hematological: Negative.    Psychiatric/Behavioral: Negative.     All other systems reviewed and are negative.          Objective       ED Triage Vitals   Temperature Pulse Blood Pressure Respirations SpO2 Patient Position - Orthostatic VS   12/15/24 1345 12/15/24 1345 12/15/24 1345  12/15/24 1345 12/15/24 1345 12/15/24 1555   98.3 °F (36.8 °C) 84 127/85 18 98 % Sitting      Temp Source Heart Rate Source BP Location FiO2 (%) Pain Score    12/15/24 1345 12/15/24 1555 12/15/24 1555 -- --    Oral Monitor Right arm        Vitals      Date and Time Temp Pulse SpO2 Resp BP Pain Score FACES Pain Rating User   12/15/24 1555 -- 79 99 % 18 118/62 -- -- GS   12/15/24 1345 98.3 °F (36.8 °C) 84 98 % 18 127/85 -- -- LD            Physical Exam  Vitals and nursing note reviewed.   Constitutional:       Appearance: Normal appearance. She is normal weight.   HENT:      Head: Normocephalic.      Right Ear: External ear normal.      Left Ear: External ear normal.      Nose: Nose normal.      Mouth/Throat:      Mouth: Mucous membranes are moist.      Pharynx: Oropharynx is clear.   Eyes:      Extraocular Movements: Extraocular movements intact.      Conjunctiva/sclera: Conjunctivae normal.      Pupils: Pupils are equal, round, and reactive to light.   Cardiovascular:      Rate and Rhythm: Normal rate and regular rhythm.      Pulses: Normal pulses.      Heart sounds: Normal heart sounds.   Pulmonary:      Effort: Pulmonary effort is normal.      Breath sounds: Normal breath sounds.   Abdominal:      General: Abdomen is flat. Bowel sounds are normal.      Palpations: Abdomen is soft.   Musculoskeletal:      Cervical back: Normal range of motion and neck supple.   Skin:     General: Skin is warm.      Capillary Refill: Capillary refill takes less than 2 seconds.   Neurological:      Mental Status: She is alert and oriented to person, place, and time. Mental status is at baseline.      GCS: GCS eye subscore is 4. GCS verbal subscore is 5. GCS motor subscore is 6.      Cranial Nerves: Cranial nerves 2-12 are intact.      Sensory: Sensation is intact.      Motor: Motor function is intact.      Coordination: Coordination is intact.      Gait: Gait is intact.      Comments: 5/5 upper extremity strength, no numbness or  tingling   5/5 lower extremity strength, no numbness or tingling left lower extremity. Numbness/tingling right lower   Psychiatric:         Mood and Affect: Mood normal.         Behavior: Behavior normal.         Thought Content: Thought content normal.         Judgment: Judgment normal.         Results Reviewed       Procedure Component Value Units Date/Time    HS Troponin I 2hr [[id number]] Collected: 12/15/24 1656    Lab Status: Final result Specimen: Blood from Arm, Right Updated: 12/15/24 1723     hs TnI 2hr <2 ng/L      Delta 2hr hsTnI --    HS Troponin I 4hr [[id number]]     Lab Status: No result Specimen: Blood     POCT pregnancy, urine [[id number]]  (Normal) Collected: 12/15/24 1531    Lab Status: Final result Updated: 12/15/24 1531     EXT Preg Test, Ur Negative     Control Valid    TSH, 3rd generation [[id number]]  (Normal) Collected: 12/15/24 1437    Lab Status: Final result Specimen: Blood from Arm, Left Updated: 12/15/24 1515     TSH 3RD GENERATON 3.350 uIU/mL     HS Troponin 0hr (reflex protocol) [[id number]]  (Normal) Collected: 12/15/24 1437    Lab Status: Final result Specimen: Blood from Arm, Left Updated: 12/15/24 1506     hs TnI 0hr <2 ng/L     Magnesium [[id number]]  (Normal) Collected: 12/15/24 1437    Lab Status: Final result Specimen: Blood from Arm, Left Updated: 12/15/24 1501     Magnesium 2.1 mg/dL     Comprehensive metabolic panel [[id number]] Collected: 12/15/24 1437    Lab Status: Final result Specimen: Blood from Arm, Left Updated: 12/15/24 1501     Sodium 136 mmol/L      Potassium 4.1 mmol/L      Chloride 105 mmol/L      CO2 25 mmol/L      ANION GAP 6 mmol/L      BUN 9 mg/dL      Creatinine 0.89 mg/dL      Glucose 108 mg/dL      Calcium 8.6 mg/dL      AST 14 U/L      ALT 8 U/L      Alkaline Phosphatase 76 U/L      Total Protein 7.3 g/dL      Albumin 4.4 g/dL      Total Bilirubin 0.39 mg/dL      eGFR 76 ml/min/1.73sq m     Narrative:      [employer] guidelines for  Chronic Kidney Disease (CKD):     Stage 1 with normal or high GFR (GFR > 90 mL/min/1.73 square meters)    Stage 2 Mild CKD (GFR = 60-89 mL/min/1.73 square meters)    Stage 3A Moderate CKD (GFR = 45-59 mL/min/1.73 square meters)    Stage 3B Moderate CKD (GFR = 30-44 mL/min/1.73 square meters)    Stage 4 Severe CKD (GFR = 15-29 mL/min/1.73 square meters)    Stage 5 End Stage CKD (GFR <15 mL/min/1.73 square meters)  Note: GFR calculation is accurate only with a steady state creatinine    CK [[id number]]  (Normal) Collected: 12/15/24 1440    Lab Status: Final result Specimen: Blood from Arm, Left Updated: 12/15/24 1500     Total CK 36 U/L     CBC and differential [[id number]]  (Abnormal) Collected: 12/15/24 1437    Lab Status: Final result Specimen: Blood from Arm, Left Updated: 12/15/24 1446     WBC 12.39 Thousand/uL      RBC 4.44 Million/uL      Hemoglobin 9.3 g/dL      Hematocrit 34.4 %      MCV 78 fL      MCH 20.9 pg      MCHC 27.0 g/dL      RDW 30.8 %      MPV 10.5 fL      Platelets 270 Thousands/uL      nRBC 0 /100 WBCs      Segmented % 70 %      Immature Grans % 1 %      Lymphocytes % 14 %      Monocytes % 8 %      Eosinophils Relative 6 %      Basophils Relative 1 %      Absolute Neutrophils 8.90 Thousands/µL      Absolute Immature Grans 0.06 Thousand/uL      Absolute Lymphocytes 1.71 Thousands/µL      Absolute Monocytes 0.94 Thousand/µL      Eosinophils Absolute 0.68 Thousand/µL      Basophils Absolute 0.10 Thousands/µL     Calcium, ionized [[id number]]  (Abnormal) Collected: 12/15/24 1440    Lab Status: Final result Specimen: Blood from Arm, Left Updated: 12/15/24 1446     Calcium, Ionized 1.07 mmol/L             X-ray chest 1 view portable   ED Interpretation by RUSH Briseno (12/15 1806)   No acute cardiopulmonary disease      CTA head and neck with and without contrast    (Results Pending)       ECG 12 Lead Documentation Only    Date/Time: 12/15/2024 2:46 PM    Performed by: Lisseth Kyle  RUSH  Authorized by: RUSH Briseno    Indications / Diagnosis:  Chest discomfort  ECG reviewed by me, the ED Provider: yes    Patient location:  ED  Interpretation:     Interpretation: normal    Rate:     ECG rate:  79    ECG rate assessment: normal    Rhythm:     Rhythm: sinus rhythm    Ectopy:     Ectopy: none    QRS:     QRS axis:  Normal  Conduction:     Conduction: normal    ST segments:     ST segments:  Normal  T waves:     T waves: normal        ED Medication and Procedure Management   Prior to Admission Medications   Prescriptions Last Dose Informant Patient Reported? Taking?   FLUoxetine (PROzac) 40 MG capsule   No No   Sig: TAKE 1 CAPSULE BY MOUTH EVERY DAY   Patient not taking: Reported on 11/25/2024   LORazepam (ATIVAN) 0.5 mg tablet   No No   Sig: Take 1 or 2 tablets every 6 hours as needed for anxiety   PARoxetine (PAXIL) 10 mg tablet   No No   Sig: TAKE 1 TABLET BY MOUTH EVERY DAY   Patient not taking: Reported on 11/25/2024   Synthroid 112 MCG tablet   No No   Sig: Take 1 tablet (112 mcg total) by mouth daily   busPIRone (BUSPAR) 5 mg tablet   No No   Sig: Take 1 tablet (5 mg total) by mouth 2 (two) times a day   Patient not taking: Reported on 11/25/2024   butalbital-acetaminophen-caffeine (FIORICET,ESGIC) -40 mg per tablet   No No   Sig: Take 1 tablet by mouth every 4 (four) hours as needed for headaches   Patient not taking: Reported on 11/25/2024   clomiPRAMINE (ANAFRANIL) 25 mg capsule   No No   Sig: Take 1 capsule (25 mg total) by mouth daily at bedtime   Patient not taking: Reported on 11/25/2024   clonazePAM (KlonoPIN) 1 mg tablet   No No   Sig: TAKE 1 TABLET BY MOUTH DAILY AT BEDTIME   Patient not taking: Reported on 11/25/2024   famotidine (PEPCID) 10 mg tablet  Self Yes No   Sig: Take 10 mg by mouth 2 (two) times a day   Patient not taking: Reported on 12/2/2022   furosemide (LASIX) 20 mg tablet   No No   Sig: Take 1 tablet (20 mg total) by mouth daily   Patient not  taking: Reported on 11/25/2024   predniSONE 10 mg tablet   No No   Sig: Take 4 tablets today, then three tablets daily for 2 days, two tablets daily for 2 days, then one tablet daily for 2 days.   Patient not taking: Reported on 11/25/2024   propranolol (INDERAL) 10 mg tablet   No No   Sig: TAKE 2 TABLETS BY MOUTH FOUR TIMES A DAY      Facility-Administered Medications: None     Patient's Medications   Discharge Prescriptions    No medications on file       ED SEPSIS DOCUMENTATION   Time reflects when diagnosis was documented in both MDM as applicable and the Disposition within this note       Time User Action Codes Description Comment    12/15/2024  5:57 PM Lisseth Kyle [R20.2] Paresthesia     12/15/2024  5:57 PM Lisseth Kyle [R07.9] Chest pain                  RUSH Briseno  12/15/24 1807

## 2024-12-16 LAB
ATRIAL RATE: 74 BPM
ATRIAL RATE: 79 BPM
P AXIS: 57 DEGREES
P AXIS: 61 DEGREES
PR INTERVAL: 176 MS
PR INTERVAL: 184 MS
QRS AXIS: 67 DEGREES
QRS AXIS: 70 DEGREES
QRSD INTERVAL: 78 MS
QRSD INTERVAL: 84 MS
QT INTERVAL: 370 MS
QT INTERVAL: 390 MS
QTC INTERVAL: 424 MS
QTC INTERVAL: 432 MS
T WAVE AXIS: 61 DEGREES
T WAVE AXIS: 62 DEGREES
VENTRICULAR RATE: 74 BPM
VENTRICULAR RATE: 79 BPM

## 2024-12-16 PROCEDURE — 93010 ELECTROCARDIOGRAM REPORT: CPT | Performed by: INTERNAL MEDICINE

## 2025-01-04 PROBLEM — Z08 ENCOUNTER FOR FOLLOW-UP EXAMINATION AFTER COMPLETED TREATMENT FOR MALIGNANT NEOPLASM: Status: RESOLVED | Noted: 2024-12-05 | Resolved: 2025-01-04

## 2025-01-12 ENCOUNTER — RESULTS FOLLOW-UP (OUTPATIENT)
Dept: EMERGENCY DEPT | Facility: HOSPITAL | Age: 50
End: 2025-01-12

## 2025-01-12 ENCOUNTER — HOSPITAL ENCOUNTER (EMERGENCY)
Facility: HOSPITAL | Age: 50
Discharge: HOME | End: 2025-01-12
Attending: EMERGENCY MEDICINE | Admitting: EMERGENCY MEDICINE
Payer: COMMERCIAL

## 2025-01-12 VITALS
TEMPERATURE: 98.5 F | HEART RATE: 76 BPM | OXYGEN SATURATION: 96 % | RESPIRATION RATE: 18 BRPM | DIASTOLIC BLOOD PRESSURE: 74 MMHG | SYSTOLIC BLOOD PRESSURE: 156 MMHG

## 2025-01-12 DIAGNOSIS — R20.2 PARESTHESIA: Primary | ICD-10-CM

## 2025-01-12 LAB
ALBUMIN SERPL BCG-MCNC: 3.9 G/DL (ref 3.5–5)
ALP SERPL-CCNC: 63 U/L (ref 34–104)
ALT SERPL W P-5'-P-CCNC: 9 U/L (ref 7–52)
ANION GAP SERPL CALCULATED.3IONS-SCNC: 9 MMOL/L (ref 4–13)
AST SERPL W P-5'-P-CCNC: 12 U/L (ref 13–39)
BASOPHILS # BLD AUTO: 0.12 THOUSANDS/ΜL (ref 0–0.1)
BASOPHILS NFR BLD AUTO: 1 % (ref 0–1)
BILIRUB SERPL-MCNC: 0.24 MG/DL (ref 0.2–1)
BUN SERPL-MCNC: 12 MG/DL (ref 5–25)
CALCIUM SERPL-MCNC: 9.6 MG/DL (ref 8.4–10.2)
CHLORIDE SERPL-SCNC: 104 MMOL/L (ref 96–108)
CO2 SERPL-SCNC: 26 MMOL/L (ref 21–32)
CREAT SERPL-MCNC: 0.92 MG/DL (ref 0.6–1.3)
EOSINOPHIL # BLD AUTO: 0.77 THOUSAND/ΜL (ref 0–0.61)
EOSINOPHIL NFR BLD AUTO: 7 % (ref 0–6)
GFR SERPL CREATININE-BSD FRML MDRD: 73 ML/MIN/1.73SQ M
GLUCOSE SERPL-MCNC: 121 MG/DL (ref 65–140)
HCT VFR BLD AUTO: 40 % (ref 34.8–46.1)
HGB BLD-MCNC: 12.8 G/DL (ref 11.5–15.4)
IMM GRANULOCYTES # BLD AUTO: 0.05 THOUSAND/UL (ref 0–0.2)
IMM GRANULOCYTES NFR BLD AUTO: 0 % (ref 0–2)
LYMPHOCYTES # BLD AUTO: 2.21 THOUSANDS/ΜL (ref 0.6–4.47)
LYMPHOCYTES NFR BLD AUTO: 19 % (ref 14–44)
MCH RBC QN AUTO: 27.5 PG (ref 26.8–34.3)
MCHC RBC AUTO-ENTMCNC: 32 G/DL (ref 31.4–37.4)
MCV RBC AUTO: 86 FL (ref 82–98)
MONOCYTES # BLD AUTO: 0.97 THOUSAND/ΜL (ref 0.17–1.22)
MONOCYTES NFR BLD AUTO: 8 % (ref 4–12)
NEUTROPHILS # BLD AUTO: 7.66 THOUSANDS/ΜL (ref 1.85–7.62)
NEUTS SEG NFR BLD AUTO: 65 % (ref 43–75)
NRBC BLD AUTO-RTO: 0 /100 WBCS
PLATELET # BLD AUTO: 249 THOUSANDS/UL (ref 149–390)
PMV BLD AUTO: 10.3 FL (ref 8.9–12.7)
POTASSIUM SERPL-SCNC: 4 MMOL/L (ref 3.5–5.3)
PROT SERPL-MCNC: 6.9 G/DL (ref 6.4–8.4)
RBC # BLD AUTO: 4.66 MILLION/UL (ref 3.81–5.12)
SODIUM SERPL-SCNC: 139 MMOL/L (ref 135–147)
T4 FREE SERPL-MCNC: 0.8 NG/DL (ref 0.61–1.12)
TSH SERPL DL<=0.05 MIU/L-ACNC: 8.68 UIU/ML (ref 0.45–4.5)
WBC # BLD AUTO: 11.78 THOUSAND/UL (ref 4.31–10.16)

## 2025-01-12 PROCEDURE — 80053 COMPREHEN METABOLIC PANEL: CPT | Performed by: EMERGENCY MEDICINE

## 2025-01-12 PROCEDURE — 85025 COMPLETE CBC W/AUTO DIFF WBC: CPT | Performed by: EMERGENCY MEDICINE

## 2025-01-12 PROCEDURE — 84443 ASSAY THYROID STIM HORMONE: CPT | Performed by: EMERGENCY MEDICINE

## 2025-01-12 PROCEDURE — 99285 EMERGENCY DEPT VISIT HI MDM: CPT

## 2025-01-12 PROCEDURE — 36415 COLL VENOUS BLD VENIPUNCTURE: CPT | Performed by: EMERGENCY MEDICINE

## 2025-01-12 PROCEDURE — 99284 EMERGENCY DEPT VISIT MOD MDM: CPT | Performed by: EMERGENCY MEDICINE

## 2025-01-12 PROCEDURE — 84439 ASSAY OF FREE THYROXINE: CPT | Performed by: EMERGENCY MEDICINE

## 2025-01-12 NOTE — ED PROVIDER NOTES
Time reflects when diagnosis was documented in both MDM as applicable and the Disposition within this note       Time User Action Codes Description Comment    1/12/2025  8:10 AM Danny Aggarwal Add [R20.2] Paresthesia           ED Disposition       ED Disposition   Discharge    Condition   Stable    Date/Time   Sun Jan 12, 2025  8:09 AM    Comment   Dionne Ford discharge to home/self care.                   Assessment & Plan       Medical Decision Making  Diff includes anxiety, electrolyte abnl, adverse drug reaction    Amount and/or Complexity of Data Reviewed  Labs: ordered.        ED Course as of 01/12/25 1805   Sun Jan 12, 2025   0802 Didn't take thyroid med  yet today       Medications - No data to display    ED Risk Strat Scores                          SBIRT 20yo+      Flowsheet Row Most Recent Value   Initial Alcohol Screen: US AUDIT-C     1. How often do you have a drink containing alcohol? 0 Filed at: 01/12/2025 0640   2. How many drinks containing alcohol do you have on a typical day you are drinking?  0 Filed at: 01/12/2025 0640   3a. Male UNDER 65: How often do you have five or more drinks on one occasion? 0 Filed at: 01/12/2025 0640   3b. FEMALE Any Age, or MALE 65+: How often do you have 4 or more drinks on one occassion? 0 Filed at: 01/12/2025 0640   Audit-C Score 0 Filed at: 01/12/2025 0640   JAGJIT: How many times in the past year have you...    Used an illegal drug or used a prescription medication for non-medical reasons? Never Filed at: 01/12/2025 0640                            History of Present Illness       Chief Complaint   Patient presents with    Medical Problem     Pt reports she was diagnosed with anemia in November and she feels like she is having symptoms, pt reports chills, sweating and weak and burning muscles       Past Medical History:   Diagnosis Date    Anemia     Anxiety     Asthma     mild     Cancer (HCC) 2019    Thyroid    Disease of thyroid gland 2019    Removed     GERD (gastroesophageal reflux disease)     Ingrown toenail 2024    Painful    Palpitations       Past Surgical History:   Procedure Laterality Date    APPENDECTOMY      AUGMENTATION MAMMAPLASTY       SECTION      DENTAL SURGERY      THYROIDECTOMY Bilateral 2019    Procedure: TOTAL THYROIDECTOMY;  Surgeon: Greg Corona MD;  Location: [facility];  Service: Surgical Oncology    US GUIDED THYROID BIOPSY  10/8/2019      Family History   Problem Relation Age of Onset    Heart disease Mother     Hypertension Mother     Heart disease Paternal Grandfather       Social History     Tobacco Use    Smoking status: Every Day     Current packs/day: 1.50     Average packs/day: 1.5 packs/day for 20.0 years (30.0 ttl pk-yrs)     Types: Cigarettes    Smokeless tobacco: Never   Vaping Use    Vaping status: Never Used   Substance Use Topics    Alcohol use: Not Currently     Comment: Social    Drug use: No      E-Cigarette/Vaping    E-Cigarette Use Never User       E-Cigarette/Vaping Substances    Nicotine No     THC No     CBD No     Flavoring No     Other No     Unknown No       I have reviewed and agree with the history as documented.     Hx from patient, pmh anxiety, thyroidectomy, iron deficiency anemia, presents with concern that she is still anemic - she feels tingling and cold in hands and feet.        Review of Systems   Constitutional:  Positive for chills. Negative for fever.   HENT:  Negative for rhinorrhea and sore throat.    Respiratory:  Negative for shortness of breath.    Cardiovascular:  Negative for chest pain.   Gastrointestinal:  Negative for constipation, diarrhea, nausea and vomiting.   Genitourinary:  Negative for dysuria and frequency.   Skin:  Negative for rash.   All other systems reviewed and are negative.          Objective       ED Triage Vitals [25 0638]   Temperature Pulse Blood Pressure Respirations SpO2 Patient Position - Orthostatic VS   98.5 °F (36.9 °C) 76 156/74 18 96 %  Sitting      Temp Source Heart Rate Source BP Location FiO2 (%) Pain Score    Oral Monitor Right arm -- --      Vitals      Date and Time Temp Pulse SpO2 Resp BP Pain Score FACES Pain Rating User   01/12/25 0638 98.5 °F (36.9 °C) 76 96 % 18 156/74 -- -- CK            Physical Exam  Vitals and nursing note reviewed.   Constitutional:       Appearance: She is well-developed.   HENT:      Head: Normocephalic and atraumatic.      Right Ear: External ear normal.      Left Ear: External ear normal.      Nose: Nose normal.   Eyes:      Conjunctiva/sclera: Conjunctivae normal.      Pupils: Pupils are equal, round, and reactive to light.   Cardiovascular:      Rate and Rhythm: Normal rate and regular rhythm.      Heart sounds: Normal heart sounds.   Pulmonary:      Effort: Pulmonary effort is normal. No respiratory distress.      Breath sounds: Normal breath sounds. No wheezing.   Abdominal:      General: Bowel sounds are normal. There is no distension.      Palpations: Abdomen is soft.      Tenderness: There is no abdominal tenderness.   Musculoskeletal:         General: No deformity. Normal range of motion.      Cervical back: Normal range of motion and neck supple. No spinous process tenderness.   Skin:     General: Skin is warm and dry.      Findings: No rash.   Neurological:      General: No focal deficit present.      Mental Status: She is alert and oriented to person, place, and time.      GCS: GCS eye subscore is 4. GCS verbal subscore is 5. GCS motor subscore is 6.      Cranial Nerves: No cranial nerve deficit.      Sensory: Sensation is intact. No sensory deficit.      Motor: No weakness.      Coordination: Coordination is intact.      Gait: Gait is intact.   Psychiatric:         Mood and Affect: Mood normal.         Results Reviewed       Procedure Component Value Units Date/Time    T4, free [[id number]]  (Normal) Collected: 01/12/25 0707    Lab Status: Final result Specimen: Blood from Arm, Right Updated: 01/12/25  "1559     Free T4 0.80 ng/dL     Narrative:        \"Therapeutic range for patients medicated with thyroid disorders: 0.61-1.24 ng/dL.\"    TSH, 3rd generation with Free T4 reflex [[id number]]  (Abnormal) Collected: 01/12/25 0707    Lab Status: Final result Specimen: Blood from Arm, Right Updated: 01/12/25 0852     TSH 3RD GENERATON 8.680 uIU/mL     Comprehensive metabolic panel [[id number]]  (Abnormal) Collected: 01/12/25 0707    Lab Status: Final result Specimen: Blood from Arm, Right Updated: 01/12/25 0735     Sodium 139 mmol/L      Potassium 4.0 mmol/L      Chloride 104 mmol/L      CO2 26 mmol/L      ANION GAP 9 mmol/L      BUN 12 mg/dL      Creatinine 0.92 mg/dL      Glucose 121 mg/dL      Calcium 9.6 mg/dL      AST 12 U/L      ALT 9 U/L      Alkaline Phosphatase 63 U/L      Total Protein 6.9 g/dL      Albumin 3.9 g/dL      Total Bilirubin 0.24 mg/dL      eGFR 73 ml/min/1.73sq m     Narrative:      National Kidney Disease Foundation guidelines for Chronic Kidney Disease (CKD):     Stage 1 with normal or high GFR (GFR > 90 mL/min/1.73 square meters)    Stage 2 Mild CKD (GFR = 60-89 mL/min/1.73 square meters)    Stage 3A Moderate CKD (GFR = 45-59 mL/min/1.73 square meters)    Stage 3B Moderate CKD (GFR = 30-44 mL/min/1.73 square meters)    Stage 4 Severe CKD (GFR = 15-29 mL/min/1.73 square meters)    Stage 5 End Stage CKD (GFR <15 mL/min/1.73 square meters)  Note: GFR calculation is accurate only with a steady state creatinine    CBC and differential [[id number]]  (Abnormal) Collected: 01/12/25 0707    Lab Status: Final result Specimen: Blood from Arm, Right Updated: 01/12/25 0715     WBC 11.78 Thousand/uL      RBC 4.66 Million/uL      Hemoglobin 12.8 g/dL      Hematocrit 40.0 %      MCV 86 fL      MCH 27.5 pg      MCHC 32.0 g/dL      MPV 10.3 fL      Platelets 249 Thousands/uL      nRBC 0 /100 WBCs      Segmented % 65 %      Immature Grans % 0 %      Lymphocytes % 19 %      Monocytes % 8 %      Eosinophils Relative 7 " %      Basophils Relative 1 %      Absolute Neutrophils 7.66 Thousands/µL      Absolute Immature Grans 0.05 Thousand/uL      Absolute Lymphocytes 2.21 Thousands/µL      Absolute Monocytes 0.97 Thousand/µL      Eosinophils Absolute 0.77 Thousand/µL      Basophils Absolute 0.12 Thousands/µL             No orders to display       Procedures    ED Medication and Procedure Management   Prior to Admission Medications   Prescriptions Last Dose Informant Patient Reported? Taking?   Synthroid 112 MCG tablet   No No   Sig: Take 1 tablet (112 mcg total) by mouth daily   propranolol (INDERAL) 10 mg tablet   No No   Sig: TAKE 2 TABLETS BY MOUTH FOUR TIMES A DAY      Facility-Administered Medications: None     Discharge Medication List as of 1/12/2025  8:11 AM        CONTINUE these medications which have NOT CHANGED    Details   propranolol (INDERAL) 10 mg tablet TAKE 2 TABLETS BY MOUTH FOUR TIMES A DAY, Starting Wed 8/21/2024, Normal      Synthroid 112 MCG tablet Take 1 tablet (112 mcg total) by mouth daily, Starting Mon 11/25/2024, Normal           No discharge procedures on file.  ED SEPSIS DOCUMENTATION   Time reflects when diagnosis was documented in both MDM as applicable and the Disposition within this note       Time User Action Codes Description Comment    1/12/2025  8:10 AM Danny Aggarwal [R20.2] Paresthesia                  Danny Aggarwal DO  01/12/25 1746

## 2025-01-18 ENCOUNTER — TELEMEDICINE (OUTPATIENT)
Dept: OTHER | Facility: HOSPITAL | Age: 50
End: 2025-01-18
Payer: COMMERCIAL

## 2025-01-18 VITALS — OXYGEN SATURATION: 99 % | SYSTOLIC BLOOD PRESSURE: 130 MMHG | DIASTOLIC BLOOD PRESSURE: 74 MMHG

## 2025-01-18 DIAGNOSIS — J01.90 ACUTE SINUSITIS, RECURRENCE NOT SPECIFIED, UNSPECIFIED LOCATION: Primary | ICD-10-CM

## 2025-01-18 PROCEDURE — 99213 OFFICE O/P EST LOW 20 MIN: CPT | Performed by: NURSE PRACTITIONER

## 2025-01-18 RX ORDER — HYDROXYZINE HYDROCHLORIDE 25 MG/1
25 TABLET, FILM COATED ORAL 3 TIMES DAILY PRN
COMMUNITY
Start: 2025-01-13

## 2025-01-18 RX ORDER — SERTRALINE HYDROCHLORIDE 25 MG/1
TABLET, FILM COATED ORAL
COMMUNITY
Start: 2025-01-16

## 2025-01-19 NOTE — PATIENT INSTRUCTIONS
"Rest and drink extra fluids.  Start antibiotic.  Take probiotic.  OTC cough and cold as needed.  Flonase can also be helpful.  Nasal saline flushes or arsen pot can help flush the sinuses.  Follow up with PCP if no improvement.  Go to ER with any worsening symptoms.     Patient Education     Sinusitis in adults   The Basics   Written by the doctors and editors at [facility]   What is sinusitis? -- Sinusitis is a condition that can cause a stuffy nose, pain in the face, and discharge or \"mucus\" from the nose.  The sinuses are hollow areas in the bones of the face (figure 1). They have a thin lining that normally makes a small amount of mucus. When this lining gets irritated or infected, it swells and makes extra mucus. This causes symptoms.  Sinusitis usually happens after a person gets sick with a cold. The germs causing the cold can infect the sinuses, too. Sometimes, other germs can be the cause of the infection. Often, a person feels like their cold is getting better. But then, they get sinusitis and begin to feel sick again.  What are the symptoms of sinusitis? -- Common symptoms of sinusitis include:   Stuffy or blocked nose   Thick white, yellow, or green discharge from the nose   Pain in the teeth   Pain or pressure in the face - This often feels worse when a person bends forward.  People with sinusitis can also have other symptoms, such as:   Fever   Cough   Trouble smelling   Ear pressure or fullness   Headache   Bad breath   Feeling tired  Most of the time, symptoms start to improve in 7 to 10 days.  Should I see a doctor or nurse? -- See your doctor or nurse if your symptoms last more than 10 days, or if your symptoms first get better but then get worse.  Rarely, sinusitis can lead to serious problems. See your doctor or nurse right away (do not wait 10 days) if you have:   Fever higher than 102°F (38.9°C)   Sudden and severe pain in the face and head   Trouble seeing, or seeing double   Trouble thinking " clearly   Swelling or redness around 1 or both eyes   Stiff neck  Is there anything I can do on my own to feel better? -- Yes. To help with your symptoms, you can:   Take an over-the-counter pain reliever to reduce the pain.   Rinse your nose and sinuses with salt water a few times a day - Ask your doctor or nurse about the best way to do this.   Drink plenty of fluids - Staying hydrated might help to thin the mucus and make it drain more easily.  Your doctor might also recommend a steroid nose spray to reduce the swelling in your nose, especially if you have allergies. Talk to your doctor if you are thinking of using a steroid spray.  How is sinusitis treated? -- Most of the time, sinusitis does not need to be treated with antibiotic medicines. This is because most sinusitis is caused by viruses, not bacteria, and antibiotics do not kill viruses. In fact, even sinusitis caused by bacteria will usually get better on its own without antibiotics.  Some people with sinusitis do need treatment with antibiotics. If your symptoms have not improved after 10 days, ask your doctor if you should take antibiotics. They might recommend that you wait 1 more week to see if your symptoms improve. But if you have symptoms such as a fever or a lot of pain, they might prescribe antibiotics. If you do get antibiotics, follow all of your doctor's instructions about taking them.  What if my symptoms do not get better? -- If your symptoms do not get better, talk with your doctor or nurse. They might order tests to figure out why you still have symptoms. These can include:   CT scan or other imaging tests - Imaging tests create pictures of the inside of the body.   A test to look inside the sinuses - For this test, a doctor puts a thin tube with a camera on the end into the nose and up into the sinuses.  Some people get a lot of sinus infections or have symptoms that last at least 3 months. These people can have a different type of  "sinusitis called \"chronic sinusitis.\" Chronic sinusitis can be caused by different things. For example, some people have growths inside their nose or sinuses that are called \"polyps.\" Other people have allergies that cause their symptoms.  Chronic sinusitis can be treated in different ways. If you have chronic sinusitis, talk with your doctor about which treatments are right for you.  All topics are updated as new evidence becomes available and our peer review process is complete.  This topic retrieved from [employer] on: Feb 28, 2024.  Topic 54154 Version 21.0  Release: 32.2.4 - C32.58  © 2024 UpToDate, Inc. and/or its affiliates. All rights reserved.  figure 1: Sinuses of the face     The sinuses are hollow areas in the bones of the face. This drawing shows where the sinuses are, from the side and front views. There are 4 pairs of sinuses, named for the bones around them: sphenoid, frontal, ethmoid, and maxillary.  Graphic 154890 Version 3.0  Consumer Information Use and Disclaimer   Disclaimer: This generalized information is a limited summary of diagnosis, treatment, and/or medication information. It is not meant to be comprehensive and should be used as a tool to help the user understand and/or assess potential diagnostic and treatment options. It does NOT include all information about conditions, treatments, medications, side effects, or risks that may apply to a specific patient. It is not intended to be medical advice or a substitute for the medical advice, diagnosis, or treatment of a health care provider based on the health care provider's examination and assessment of a patient's specific and unique circumstances. Patients must speak with a health care provider for complete information about their health, medical questions, and treatment options, including any risks or benefits regarding use of medications. This information does not endorse any treatments or medications as safe, effective, or approved for " treating a specific patient. UpToDate, Inc. and its affiliates disclaim any warranty or liability relating to this information or the use thereof.The use of this information is governed by the Terms of Use, available at https://www.wolSferrauwer.com/en/know/clinical-effectiveness-terms. 2024© UpToDate, Inc. and its affiliates and/or licensors. All rights reserved.  Copyright   © 2024 UpToDate, Inc. and/or its affiliates. All rights reserved.

## 2025-01-19 NOTE — PROGRESS NOTES
Virtual Regular Visit  Name: Dionne Ford      : 1975      MRN: [id number]  Encounter Provider: RUSH Rankin  Encounter Date: 2025   Encounter department: VIRTUAL CARE       Verification of patient location:  Patient is located at Home in the following state in which I hold an active license [place] :  Assessment & Plan  Acute sinusitis, recurrence not specified, unspecified location    Orders:    amoxicillin-clavulanate (AUGMENTIN) 875-125 mg per tablet; Take 1 tablet by mouth every 12 (twelve) hours for 10 days        Encounter provider RUSH Rankin    The patient was identified by name and date of birth. Dionne Ford was informed that this is a telemedicine visit and that the visit is being conducted through the Epic Embedded platform. She agrees to proceed..  My office door was closed. No one else was in the room.  She acknowledged consent and understanding of privacy and security of the video platform. The patient has agreed to participate and understands they can discontinue the visit at any time.    Patient is aware this is a billable service.     History was obtained from: History obtained from: patient  History of Present Illness     This is a 49 year old female here today for video visit.  She states she has had congestion for several.  She states it has been getting worse gradually.  She denies any fever.  She states she has been having some increased sinus pressure.  She states she has increased pressure in sinus when she bends forward.  She is having some yellow nasal discharge. She has post nasal drip that she has cough.      Review of Systems   Constitutional:  Positive for fatigue. Negative for activity change, chills and fever.   HENT:  Positive for congestion.    Respiratory: Negative.     Cardiovascular: Negative.    Neurological: Negative.    Psychiatric/Behavioral: Negative.         Objective   /74   LMP  (LMP Unknown)   SpO2 99%      Physical Exam  Constitutional:       General: She is not in acute distress.     Appearance: Normal appearance. She is not ill-appearing or toxic-appearing.   HENT:      Head: Normocephalic and atraumatic.      Nose: Congestion and rhinorrhea present.   Pulmonary:      Effort: Pulmonary effort is normal. No respiratory distress.   Neurological:      Mental Status: She is alert.   Psychiatric:         Mood and Affect: Mood normal.         Thought Content: Thought content normal.         Judgment: Judgment normal.         Visit Time  Total Visit Duration:  minutes not including the time spent for establishing the audio/video connection.

## 2025-02-18 DIAGNOSIS — E89.0 POST-SURGICAL HYPOTHYROIDISM: ICD-10-CM

## 2025-02-19 RX ORDER — LEVOTHYROXINE SODIUM 112 MCG
112 TABLET ORAL DAILY
Qty: 30 TABLET | Refills: 3 | Status: SHIPPED | OUTPATIENT
Start: 2025-02-19

## 2025-02-21 ENCOUNTER — TELEPHONE (OUTPATIENT)
Age: 50
End: 2025-02-21

## 2025-02-21 NOTE — TELEPHONE ENCOUNTER
PA for Synthroid 112 MCG tablet SUBMITTED to [employer]     via      [x][employer]-Case ID # [id number]      [x]PA sent as URGENT    All office notes, labs and other pertaining documents and studies sent. Clinical questions answered. Awaiting determination from insurance company.     Turnaround time for your insurance to make a decision on your Prior Authorization can take 7-21 business days.

## 2025-02-21 NOTE — TELEPHONE ENCOUNTER
PA for Synthroid 112 MCG tablet  APPROVED     Date(s) approved February 21, 2025 to February 21, 2026     Case # [id number]     Patient advised by          []MyChart Message  [x]Phone call   []LMOM  []L/M to call office as no active Communication consent on file  []Unable to leave detailed message as VM not approved on Communication consent       Pharmacy advised by    [x]Fax  []Phone call    Specialty Pharmacy    []     Approval letter scanned into Media Yes

## 2025-03-09 ENCOUNTER — TELEMEDICINE (OUTPATIENT)
Dept: OTHER | Facility: HOSPITAL | Age: 50
End: 2025-03-09
Payer: COMMERCIAL

## 2025-03-09 VITALS — TEMPERATURE: 98.8 F | HEART RATE: 80 BPM

## 2025-03-09 DIAGNOSIS — E89.0 POST-SURGICAL HYPOTHYROIDISM: ICD-10-CM

## 2025-03-09 DIAGNOSIS — N30.00 ACUTE CYSTITIS WITHOUT HEMATURIA: Primary | ICD-10-CM

## 2025-03-09 DIAGNOSIS — D72.10 EOSINOPHILIA, UNSPECIFIED TYPE: ICD-10-CM

## 2025-03-09 PROBLEM — R00.0 TACHYCARDIA: Status: RESOLVED | Noted: 2020-10-14 | Resolved: 2025-03-09

## 2025-03-09 PROBLEM — E04.2 MULTIPLE THYROID NODULES: Status: RESOLVED | Noted: 2019-10-22 | Resolved: 2025-03-09

## 2025-03-09 PROBLEM — Z23 ENCOUNTER FOR IMMUNIZATION: Status: RESOLVED | Noted: 2019-10-14 | Resolved: 2025-03-09

## 2025-03-09 PROBLEM — R79.9 ABNORMAL BLOOD CHEMISTRY: Status: RESOLVED | Noted: 2022-03-23 | Resolved: 2025-03-09

## 2025-03-09 PROCEDURE — 99214 OFFICE O/P EST MOD 30 MIN: CPT | Performed by: PHYSICIAN ASSISTANT

## 2025-03-09 RX ORDER — CYCLOBENZAPRINE HCL 10 MG
10 TABLET ORAL
COMMUNITY
Start: 2025-01-16

## 2025-03-09 RX ORDER — NITROFURANTOIN 25; 75 MG/1; MG/1
100 CAPSULE ORAL 2 TIMES DAILY
Qty: 14 CAPSULE | Refills: 0 | Status: SHIPPED | OUTPATIENT
Start: 2025-03-09 | End: 2025-03-16

## 2025-03-09 RX ORDER — PHENAZOPYRIDINE HYDROCHLORIDE 200 MG/1
200 TABLET, FILM COATED ORAL
Qty: 10 TABLET | Refills: 0 | Status: SHIPPED | OUTPATIENT
Start: 2025-03-09

## 2025-03-09 NOTE — PATIENT INSTRUCTIONS
"You can go to any outpatient [facility] to complete the testing that was ordered  Just provide your name and date of birth at registration and they will be able to pull up the orders.  You can search for a lab using [employer] \"Find Care\" and filter by \"Labs\" or click the link below:  [email or web address]    Call [phone], option 2 to request a mobile lab visit to your home    The results will go directly to your [employer] tomas under \"Test Results\"  You should be able to screenshot the results, or you can print if opened from a web browser    "

## 2025-03-09 NOTE — PROGRESS NOTES
Virtual Regular Visit  Name: Dionne Ford      : 1975      MRN: [id number]  Encounter Provider: Shannon D Severino, PA-C  Encounter Date: 3/9/2025   Encounter department: VIRTUAL CARE       Verification of patient location:  Patient is located at Home in the following state in which [place] an [place] license [place] :  Assessment & Plan  Acute cystitis without hematuria    Orders:    nitrofurantoin (MACROBID) 100 mg capsule; Take 1 capsule (100 mg total) by mouth 2 (two) times a day for 7 days    phenazopyridine (PYRIDIUM) 200 mg tablet; Take 1 tablet (200 mg total) by mouth 3 (three) times a day with meals    Eosinophilia, unspecified type    Orders:    Ambulatory Referral to Allergy; Future    Post-surgical hypothyroidism    Orders:    TSH, 3rd generation with Free T4 reflex; Future    Reviewed labs from recent ED visit. Eosinophils 7, TSH high    Encounter provider Shannon D Severino, PA-C    The patient was identified by name and date of birth. Dionne Ford was informed that this is a telemedicine visit and that the visit is being conducted through the Epic Embedded platform. She agrees to proceed..  My office door was closed. No one else was in the room.  She acknowledged consent and understanding of privacy and security of the video platform. The patient has agreed to participate and understands they can discontinue the visit at any time.    Patient is aware this is a billable service.     History obtained from: patient  History of Present Illness     Pt reports yesterday she woke up with pain in pelvis, frequency, dysuria, urgency, voiding small amounts, urge incontinence. Had some L sided pain close to the hip yesterday which resolved on its own. Denies hematuria, fevers, concern for STI or pregnancy. Has been having vaginal spotting, but recently d/c provera. Tried drinking cranberry juice, increasing water intake with some improvement in the pain.    Patient also developed  swelling to the base of her neck x 2 months. Gets numbness between her shoulder blades.       Review of Systems   Constitutional:  Negative for fever.   HENT:  Negative for nosebleeds.    Eyes:  Negative for redness.   Respiratory:  Negative for shortness of breath.    Cardiovascular:  Negative for chest pain.   Gastrointestinal:  Negative for blood in stool.   Genitourinary:  Positive for dysuria, frequency and urgency. Negative for hematuria.   Musculoskeletal:  Negative for gait problem.   Skin:  Negative for rash.   Neurological:  Negative for seizures.   Psychiatric/Behavioral:  Negative for behavioral problems.        Objective   Pulse 80   Temp 98.8 °F (37.1 °C)     Physical Exam  Constitutional:       General: She is not in acute distress.     Appearance: Normal appearance. She is obese. She is not toxic-appearing.   HENT:      Head: Normocephalic and atraumatic.      Nose: No rhinorrhea.      Mouth/Throat:      Mouth: Mucous membranes are moist.   Eyes:      Conjunctiva/sclera: Conjunctivae normal.   Neck:     Pulmonary:      Effort: Pulmonary effort is normal. No respiratory distress.      Breath sounds: No wheezing (no gross audible wheeze through computer).   Musculoskeletal:      Cervical back: Normal range of motion.   Skin:     Findings: No rash (on face or neck).   Neurological:      Mental Status: She is alert.      Cranial Nerves: No dysarthria or facial asymmetry.   Psychiatric:         Mood and Affect: Mood normal.         Behavior: Behavior normal.         Visit Time  Total Visit Duration: 18 minutes not including the time spent for establishing the audio/video connection.

## 2025-03-11 ENCOUNTER — OFFICE VISIT (OUTPATIENT)
Dept: FAMILY MEDICINE CLINIC | Facility: HOSPITAL | Age: 50
End: 2025-03-11
Payer: COMMERCIAL

## 2025-03-11 ENCOUNTER — PATIENT MESSAGE (OUTPATIENT)
Age: 50
End: 2025-03-11

## 2025-03-11 VITALS
HEIGHT: 69 IN | OXYGEN SATURATION: 97 % | DIASTOLIC BLOOD PRESSURE: 90 MMHG | TEMPERATURE: 98.6 F | BODY MASS INDEX: 39.07 KG/M2 | WEIGHT: 263.8 LBS | SYSTOLIC BLOOD PRESSURE: 138 MMHG | HEART RATE: 82 BPM

## 2025-03-11 DIAGNOSIS — E66.9 OBESITY (BMI 30-39.9): ICD-10-CM

## 2025-03-11 DIAGNOSIS — Z11.59 NEED FOR HEPATITIS C SCREENING TEST: ICD-10-CM

## 2025-03-11 DIAGNOSIS — Z85.850 HISTORY OF THYROID CANCER: ICD-10-CM

## 2025-03-11 DIAGNOSIS — D50.9 IRON DEFICIENCY ANEMIA, UNSPECIFIED IRON DEFICIENCY ANEMIA TYPE: Primary | ICD-10-CM

## 2025-03-11 DIAGNOSIS — H92.03 EAR PAIN, REFERRED, BILATERAL: ICD-10-CM

## 2025-03-11 DIAGNOSIS — Z12.4 SCREENING FOR CERVICAL CANCER: ICD-10-CM

## 2025-03-11 DIAGNOSIS — Z12.31 ENCOUNTER FOR SCREENING MAMMOGRAM FOR BREAST CANCER: ICD-10-CM

## 2025-03-11 DIAGNOSIS — F41.9 ANXIETY: ICD-10-CM

## 2025-03-11 DIAGNOSIS — Z11.4 SCREENING FOR HIV (HUMAN IMMUNODEFICIENCY VIRUS): ICD-10-CM

## 2025-03-11 DIAGNOSIS — E89.0 POST-SURGICAL HYPOTHYROIDISM: ICD-10-CM

## 2025-03-11 PROCEDURE — 99214 OFFICE O/P EST MOD 30 MIN: CPT

## 2025-03-11 RX ORDER — DULOXETIN HYDROCHLORIDE 30 MG/1
CAPSULE, DELAYED RELEASE ORAL
Qty: 187 CAPSULE | Refills: 0 | Status: SHIPPED | OUTPATIENT
Start: 2025-03-11 | End: 2025-06-16

## 2025-03-11 NOTE — PROGRESS NOTES
Name: Dionne Ford      : 1975      MRN: [id number]  Encounter Provider: Danny Vo MD  Encounter Date: 3/11/2025   Encounter department: [facility] CARE [address]  :  Assessment & Plan  Iron deficiency anemia, unspecified iron deficiency anemia type  Presumed to be secondary to heavy menses, following with gynecology, denies any other known sources of bleeding, currently on iron supplementation, will recheck CBC and iron panel and sent for colon cancer screening  Orders:    CBC and differential; Future    Fe+TIBC+Jacky; Future    Need for hepatitis C screening test    Orders:    HEPATITIS C AB W/RFL RNA, PCR W/RFL GENOTYPE,LIPA ([facility]); Future    Screening for HIV (human immunodeficiency virus)    Orders:    HIV 1/2 Antigen/Antibody (Fourth Generation) with Reflex Testing ([facility], [facility], or EXTERNAL LAB); Future    Screening for cervical cancer  Advised to follow-up with gynecologist for Pap smear       Encounter for screening mammogram for breast cancer    Orders:    Mammo screening bilateral w 3d and cad; Future    Post-surgical hypothyroidism    Orders:    TSH + Free T4; Future    History of thyroid cancer  Lost to follow-up with surgical oncology, advised to reestablish to assess need for surveillance  Orders:    Ambulatory Referral to Surgical Oncology; Future    Anxiety  Reports poor response to zoloft, will trial SNRI  Orders:    Ambulatory referral to Psych Services; Future    DULoxetine (CYMBALTA) 30 mg delayed release capsule; Take 1 capsule (30 mg total) by mouth daily for 7 days, THEN 2 capsules (60 mg total) daily.    Ear pain, referred, bilateral  Reports chronic bilateral ear pressure, normal exam, will refer to ent  Orders:    Ambulatory Referral to Otolaryngology; Future    Obesity (BMI 30-39.9)      Orders:    Lipid Panel with Direct LDL reflex; Future           History of Present Illness   HPI    Patient presents to establish care    Review of Systems  "  Constitutional:  Negative for chills and fever.   Respiratory:  Negative for cough and shortness of breath.    Psychiatric/Behavioral:  The patient is nervous/anxious.        Objective   /90   Pulse 82   Temp 98.6 °F (37 °C)   Ht 5' 9\" (1.753 m)   Wt 120 kg (263 lb 12.8 oz)   SpO2 97%   BMI 38.96 kg/m²      Physical Exam  Constitutional:       General: She is not in acute distress.     Appearance: Normal appearance. She is obese.   HENT:      Right Ear: Tympanic membrane, ear canal and external ear normal. There is no impacted cerumen.      Left Ear: Tympanic membrane, ear canal and external ear normal. There is no impacted cerumen.   Cardiovascular:      Rate and Rhythm: Normal rate and regular rhythm.      Heart sounds: Normal heart sounds. No murmur heard.  Lymphadenopathy:      Cervical: No cervical adenopathy.   Neurological:      Mental Status: She is alert and oriented to person, place, and time.   Psychiatric:         Behavior: Behavior normal.         "

## 2025-03-11 NOTE — ASSESSMENT & PLAN NOTE
Lost to follow-up with surgical oncology, advised to reestablish to assess need for surveillance  Orders:    Ambulatory Referral to Surgical Oncology; Future

## 2025-03-12 ENCOUNTER — TELEPHONE (OUTPATIENT)
Dept: FAMILY MEDICINE CLINIC | Facility: HOSPITAL | Age: 50
End: 2025-03-12

## 2025-03-12 ENCOUNTER — TELEPHONE (OUTPATIENT)
Dept: ADMINISTRATIVE | Facility: OTHER | Age: 50
End: 2025-03-12

## 2025-03-12 DIAGNOSIS — Z85.850 HISTORY OF THYROID CANCER: ICD-10-CM

## 2025-03-12 DIAGNOSIS — E89.0 POST-SURGICAL HYPOTHYROIDISM: Primary | ICD-10-CM

## 2025-03-12 NOTE — TELEPHONE ENCOUNTER
----- Message from Claire ESCOTO sent at 3/11/2025 10:58 AM EDT -----  Regarding: pap  03/11/25 11:00 AM    Hello, our patient Dionne Ford has had Pap Smear (HPV) aka Cervical Cancer Screening completed/performed. Please assist in updating the patient chart by making an External outreach to [facility] facility located in [place]. The date of service is with the last couple of years.    Thank you,  CLAIRE RAO   [facility] [address] [facility]

## 2025-03-12 NOTE — LETTER
Procedure Request Form: Cervical Cancer Screening      Date Requested: 25  Patient: Dionne Ford  Patient : 1975   Referring Provider: Danny Vo MD        Date of Procedure ______________________________       The above patient has informed us that they have completed their   most recent Cervical Cancer Screening at your facility. Please complete   this form and attach all corresponding procedure reports/results.    Comments __________________________________________________________  ____________________________________________________________________  ____________________________________________________________________  ____________________________________________________________________    Facility Completing Procedure _________________________________________    Form Completed By (print name) _______________________________________      Signature __________________________________________________________      These reports are needed for  compliance.    Please fax this completed form and a copy of the procedure report to the [facility] as soon as possible via Fax [phone], alejandro Altamirano: Phone [phone]. Our office is located at [address] [place] [address].    We thank you for your assistance in treating our mutual patient.

## 2025-03-12 NOTE — TELEPHONE ENCOUNTER
Upon review of the In Basket request and the patient's chart, initial outreach has been made via fax to facility. Please see Contacts section for details.     Thank you  Adarsh Clayton MA

## 2025-03-12 NOTE — TELEPHONE ENCOUNTER
----- Message from Danny Taveras MD sent at 3/12/2025 10:41 AM EDT -----  Regarding: US for thyroid cancer surveillance  Please inform patient that I discussed surveillance imaging with her cancer surgeon due to her history of thyroid cancer.  We recommend an ultrasound of her head and neck with lymph node mapping, order placed.  Thanks

## 2025-03-12 NOTE — LETTER
Procedure Request Form: Cervical Cancer Screening      Date Requested: 25  Patient: Dionne Ford  Patient : 1975   Referring Provider: Danny Vo MD        Date of Procedure ______________________________       The above patient has informed us that they have completed their   most recent Cervical Cancer Screening at your facility. Please complete   this form and attach all corresponding procedure reports/results.    Comments __________________________________________________________  ____________________________________________________________________  ____________________________________________________________________  ____________________________________________________________________    Facility Completing Procedure _________________________________________    Form Completed By (print name) _______________________________________      Signature __________________________________________________________      These reports are needed for  compliance.    Please fax this completed form and a copy of the procedure report to our office located at [address] [place] [address] as soon as possible to Fax [phone] alejandro Altamirano: Phone [phone]    We thank you for your assistance in treating our mutual patient.

## 2025-03-21 NOTE — TELEPHONE ENCOUNTER
Upon review of the In Basket request we have found as a result of outreach that the patient did not have the requested item(s) completed.     Any additional questions or concerns should be emailed to the Practice Liaisons via the appropriate education email address, please do not reply via In Basket.    Thank you  Adarsh Clayton MA   [facility]

## 2025-03-21 NOTE — TELEPHONE ENCOUNTER
As a follow-up, a second attempt has been made for outreach via fax to facility. Please see Contacts section for details.    Thank you  Adarsh Clayton MA

## 2025-03-24 ENCOUNTER — TELEPHONE (OUTPATIENT)
Age: 50
End: 2025-03-24

## 2025-03-24 NOTE — TELEPHONE ENCOUNTER
I advised pts  its ok to have all the labs done at once. He said with her anxiety she won't do them all. I told him to tell the lab she only is going to do a few at a time

## 2025-03-24 NOTE — TELEPHONE ENCOUNTER
Patient's  called stating patient has just had a heavy period and is anemic.  Patient has to get numerous labs and he is concerned that it may affect anemia.  He would like to know if she can only do a few at at time and start with just the synthroid and iron labs.  Please advise and contact .

## 2025-03-25 ENCOUNTER — OFFICE VISIT (OUTPATIENT)
Dept: FAMILY MEDICINE CLINIC | Facility: HOSPITAL | Age: 50
End: 2025-03-25
Payer: COMMERCIAL

## 2025-03-25 VITALS
HEIGHT: 69 IN | OXYGEN SATURATION: 99 % | DIASTOLIC BLOOD PRESSURE: 98 MMHG | SYSTOLIC BLOOD PRESSURE: 136 MMHG | BODY MASS INDEX: 39.4 KG/M2 | WEIGHT: 266 LBS | HEART RATE: 89 BPM

## 2025-03-25 DIAGNOSIS — E78.5 DYSLIPIDEMIA: Primary | ICD-10-CM

## 2025-03-25 DIAGNOSIS — L56.8 PHOTOSENSITIVITY: ICD-10-CM

## 2025-03-25 DIAGNOSIS — I10 PRIMARY HYPERTENSION: ICD-10-CM

## 2025-03-25 LAB
CHOLEST SERPL-MCNC: 218 MG/DL (ref 100–199)
HDLC SERPL-MCNC: 30 MG/DL
LDLC SERPL CALC-MCNC: 134 MG/DL (ref 0–99)
LDLC/HDLC SERPL: 4.5 RATIO (ref 0–3.2)
SL AMB VLDL CHOLESTEROL CALC: 54 MG/DL (ref 5–40)
TRIGL SERPL-MCNC: 297 MG/DL (ref 0–149)

## 2025-03-25 PROCEDURE — 99214 OFFICE O/P EST MOD 30 MIN: CPT

## 2025-03-25 RX ORDER — ATORVASTATIN CALCIUM 40 MG/1
40 TABLET, FILM COATED ORAL DAILY
Qty: 90 TABLET | Refills: 0 | Status: SHIPPED | OUTPATIENT
Start: 2025-03-25 | End: 2025-04-11 | Stop reason: DRUGHIGH

## 2025-03-25 RX ORDER — LOSARTAN POTASSIUM 50 MG/1
50 TABLET ORAL DAILY
Qty: 30 TABLET | Refills: 0 | Status: SHIPPED | OUTPATIENT
Start: 2025-03-25 | End: 2025-04-11 | Stop reason: SDUPTHER

## 2025-03-25 NOTE — PROGRESS NOTES
"Name: Dionne Ford      : 1975      MRN: [id number]  Encounter Provider: Danny Vo MD  Encounter Date: 3/25/2025   Encounter department: [facility] PRIMARY CARE [address]  :  Assessment & Plan  Dyslipidemia  Starting atorvastatin 40 mg daily  Orders:    Lipid Panel with Direct LDL reflex; Future    Comprehensive metabolic panel; Future    Primary hypertension  Home blood pressures in the 130s over 90s, diastolic consistently above target, will start losartan 50 mg daily, return to office in 2 weeks with blood pressure logs       Photosensitivity  Urology unclear, referral to dermatology  Orders:    Ambulatory Referral to Dermatology; Future      The 10-year ASCVD risk score (Richi WHITE, et al., 2019) is: 11.3%    Values used to calculate the score:      Age: 49 years      Clincally relevant sex: Female      Is Non- : No      Diabetic: No      Tobacco smoker: Yes      Systolic Blood Pressure: 133 mmHg      Is BP treated: Yes      HDL Cholesterol: 28 mg/dL      Total Cholesterol: 199 mg/dL       History of Present Illness   HPI  130s/90s    Review of Systems   Constitutional:  Negative for chills and fever.   Respiratory:  Negative for shortness of breath.    Cardiovascular:  Negative for chest pain.   Gastrointestinal:  Negative for diarrhea, nausea and vomiting.   Neurological:  Negative for dizziness and headaches.       Objective   /98   Pulse 89   Ht 5' 9\" (1.753 m)   Wt 121 kg (266 lb)   SpO2 99%   BMI 39.28 kg/m²      Physical Exam  Constitutional:       General: She is not in acute distress.     Appearance: Normal appearance. She is not ill-appearing, toxic-appearing or diaphoretic.   HENT:      Head: Normocephalic.      Right Ear: Tympanic membrane, ear canal and external ear normal. There is no impacted cerumen.      Left Ear: Tympanic membrane, ear canal and external ear normal. There is no impacted cerumen.      Mouth/Throat:      " Mouth: Mucous membranes are moist.      Pharynx: Oropharynx is clear.     Eyes:      Conjunctiva/sclera: Conjunctivae normal.       Cardiovascular:      Rate and Rhythm: Normal rate and regular rhythm.      Heart sounds: Normal heart sounds. No murmur heard.  Pulmonary:      Effort: Pulmonary effort is normal. No respiratory distress.      Breath sounds: Normal breath sounds.     Neurological:      Mental Status: She is alert and oriented to person, place, and time.     Psychiatric:         Mood and Affect: Mood normal.         Behavior: Behavior normal.

## 2025-03-26 ENCOUNTER — RESULTS FOLLOW-UP (OUTPATIENT)
Dept: FAMILY MEDICINE CLINIC | Facility: HOSPITAL | Age: 50
End: 2025-03-26

## 2025-03-26 DIAGNOSIS — R00.0 TACHYARRHYTHMIA: ICD-10-CM

## 2025-03-27 RX ORDER — PROPRANOLOL HYDROCHLORIDE 10 MG/1
20 TABLET ORAL 4 TIMES DAILY
Qty: 720 TABLET | Refills: 1 | Status: SHIPPED | OUTPATIENT
Start: 2025-03-27

## 2025-04-02 DIAGNOSIS — F41.9 ANXIETY: ICD-10-CM

## 2025-04-02 LAB
BASOPHILS # BLD AUTO: 0.2 X10E3/UL (ref 0–0.2)
BASOPHILS NFR BLD AUTO: 1 %
EOSINOPHIL # BLD AUTO: 0.7 X10E3/UL (ref 0–0.4)
EOSINOPHIL NFR BLD AUTO: 7 %
ERYTHROCYTE [DISTWIDTH] IN BLOOD BY AUTOMATED COUNT: 13.6 % (ref 11.7–15.4)
FERRITIN SERPL-MCNC: 30 NG/ML (ref 15–150)
HCT VFR BLD AUTO: 44.8 % (ref 34–46.6)
HGB BLD-MCNC: 14.9 G/DL (ref 11.1–15.9)
IMM GRANULOCYTES # BLD: 0 X10E3/UL (ref 0–0.1)
IMM GRANULOCYTES NFR BLD: 0 %
IRON SATN MFR SERPL: 14 % (ref 15–55)
IRON SERPL-MCNC: 43 UG/DL (ref 27–159)
LYMPHOCYTES # BLD AUTO: 2.1 X10E3/UL (ref 0.7–3.1)
LYMPHOCYTES NFR BLD AUTO: 20 %
MCH RBC QN AUTO: 31 PG (ref 26.6–33)
MCHC RBC AUTO-ENTMCNC: 33.3 G/DL (ref 31.5–35.7)
MCV RBC AUTO: 93 FL (ref 79–97)
MONOCYTES # BLD AUTO: 0.9 X10E3/UL (ref 0.1–0.9)
MONOCYTES NFR BLD AUTO: 9 %
NEUTROPHILS # BLD AUTO: 6.5 X10E3/UL (ref 1.4–7)
NEUTROPHILS NFR BLD AUTO: 63 %
PLATELET # BLD AUTO: 268 X10E3/UL (ref 150–450)
RBC # BLD AUTO: 4.8 X10E6/UL (ref 3.77–5.28)
T4 FREE SERPL DIALY-MCNC: 1.4 NG/DL
TIBC SERPL-MCNC: 316 UG/DL (ref 250–450)
TSH SERPL-ACNC: 5.8 UU/ML
UIBC SERPL-MCNC: 273 UG/DL (ref 131–425)
WBC # BLD AUTO: 10.4 X10E3/UL (ref 3.4–10.8)

## 2025-04-03 RX ORDER — DULOXETIN HYDROCHLORIDE 30 MG/1
CAPSULE, DELAYED RELEASE ORAL
Qty: 180 CAPSULE | Refills: 1 | Status: SHIPPED | OUTPATIENT
Start: 2025-04-03 | End: 2025-07-09

## 2025-04-09 ENCOUNTER — OFFICE VISIT (OUTPATIENT)
Dept: URGENT CARE | Facility: CLINIC | Age: 50
End: 2025-04-09
Payer: COMMERCIAL

## 2025-04-09 ENCOUNTER — NURSE TRIAGE (OUTPATIENT)
Age: 50
End: 2025-04-09

## 2025-04-09 VITALS
RESPIRATION RATE: 18 BRPM | SYSTOLIC BLOOD PRESSURE: 141 MMHG | DIASTOLIC BLOOD PRESSURE: 77 MMHG | TEMPERATURE: 97.9 F | OXYGEN SATURATION: 96 % | HEART RATE: 99 BPM

## 2025-04-09 DIAGNOSIS — R22.1 NECK SWELLING: ICD-10-CM

## 2025-04-09 DIAGNOSIS — R21 RASH: Primary | ICD-10-CM

## 2025-04-09 DIAGNOSIS — E89.0 POST-SURGICAL HYPOTHYROIDISM: ICD-10-CM

## 2025-04-09 PROCEDURE — 99203 OFFICE O/P NEW LOW 30 MIN: CPT

## 2025-04-09 RX ORDER — TRIAMCINOLONE ACETONIDE 1 MG/G
CREAM TOPICAL 2 TIMES DAILY
Qty: 45 G | Refills: 0 | Status: SHIPPED | OUTPATIENT
Start: 2025-04-09

## 2025-04-09 RX ORDER — LEVOTHYROXINE SODIUM 112 MCG
112 TABLET ORAL DAILY
Qty: 90 TABLET | Refills: 1 | Status: SHIPPED | OUTPATIENT
Start: 2025-04-09 | End: 2025-04-11 | Stop reason: DRUGHIGH

## 2025-04-09 NOTE — TELEPHONE ENCOUNTER
"FOLLOW UP: No same day appointments at the office. Advised  to have Pt proceed to Urgent Care now.  verbalized understanding and is agreeable with plan. Pt has appointment on Friday with PCP, will keep this appointment as well.    REASON FOR CONVERSATION: Allergic Reaction    SYMPTOMS: see below    OTHER:  reports that he had left over medication from daughter. He administered 20 mg prednisone to Pt yesterday with some improvement in swelling.  administered another 20 mg today and swelling improved some. Symptoms seem to return when prednisone wears off per . Advised  not to administer medication that is not prescribed for the Pt to the Pt for safety reasons.  Encouraged  to report the administered medication and dose to Urgent Care Provider today when Pt is seen, for safety with any medications that the Provider may order during today's visit.  verbalized understanding and is agreeable.    DISPOSITION: Go to Urgent Care Now (overriding See PCP Within 24 Hours)      Reason for Disposition   Taking new prescription medicine  (Exceptions: finished taking new prescription antibiotic OR questions about flushing from niacin)    Additional Information   [1] Drug allergy suspected AND [2] taking prescription medicine AND [3] widespread rash    Answer Assessment - Initial Assessment Questions  1. APPEARANCE of RASH: \"Describe the rash.\" (e.g., spots, blisters, raised areas, skin peeling, scaly)      Skin is swollen to posterior neck below hair line, across shoulders, and to the back of BUE    2. SIZE: \"How big are the spots?\" (e.g., tip of pen, eraser, coin; inches, centimeters)      Denies spots, rash, lesions, blisters, etc. Swelling is present and skin is flushed    3. ONSET: \"When did the rash begin?\"      Yesterday    4. FEVER: \"Do you have a fever?\" If Yes, ask: \"What is your temperature, how was it measured, and when did it start?\"      denies    5. ITCHING: " "\"Does the rash itch?\" If Yes, ask: \"How bad is the itch?\" (Scale 1-10; or mild, moderate, severe)      Not itching, but is burning. Pt reports skin feels like it's on fire    6. CAUSE: \"What do you think is causing the rash?\"       feels this is related to Pt's medication for cholesterol. Read these symptoms can be a side effect    7. NEW MEDICINES: \"What new medicines are you taking?\" (e.g., name of antibiotic) \"When did you start taking this medication?\".      Atorvastatin ordered 3/25/25    8. OTHER SYMPTOMS: \"Do you have any other symptoms?\" (e.g., sore throat, fever, joint pain)        Denies swelling to face, lips, mouth, tongue, or throat. Denies difficulty breathing.    Protocols used: Allergic Reactions - Guideline Selection-Adult-AH, Rash - Widespread On Drugs-Adult-AH    "

## 2025-04-11 ENCOUNTER — TELEPHONE (OUTPATIENT)
Age: 50
End: 2025-04-11

## 2025-04-11 ENCOUNTER — OFFICE VISIT (OUTPATIENT)
Dept: FAMILY MEDICINE CLINIC | Facility: HOSPITAL | Age: 50
End: 2025-04-11
Payer: COMMERCIAL

## 2025-04-11 ENCOUNTER — DOCUMENTATION (OUTPATIENT)
Dept: ADMINISTRATIVE | Facility: OTHER | Age: 50
End: 2025-04-11

## 2025-04-11 VITALS
HEART RATE: 96 BPM | WEIGHT: 263.6 LBS | DIASTOLIC BLOOD PRESSURE: 88 MMHG | OXYGEN SATURATION: 97 % | HEIGHT: 69 IN | SYSTOLIC BLOOD PRESSURE: 130 MMHG | BODY MASS INDEX: 39.04 KG/M2

## 2025-04-11 DIAGNOSIS — E78.2 MIXED HYPERLIPIDEMIA: ICD-10-CM

## 2025-04-11 DIAGNOSIS — Z86.2 HISTORY OF IRON DEFICIENCY ANEMIA: ICD-10-CM

## 2025-04-11 DIAGNOSIS — E89.0 POST-SURGICAL HYPOTHYROIDISM: ICD-10-CM

## 2025-04-11 DIAGNOSIS — Z12.11 COLON CANCER SCREENING: Primary | ICD-10-CM

## 2025-04-11 DIAGNOSIS — I10 PRIMARY HYPERTENSION: ICD-10-CM

## 2025-04-11 DIAGNOSIS — R20.2 PARESTHESIA: ICD-10-CM

## 2025-04-11 DIAGNOSIS — M54.2 NECK PAIN: ICD-10-CM

## 2025-04-11 PROCEDURE — 99214 OFFICE O/P EST MOD 30 MIN: CPT

## 2025-04-11 RX ORDER — LOSARTAN POTASSIUM 50 MG/1
50 TABLET ORAL DAILY
Qty: 90 TABLET | Refills: 1 | Status: SHIPPED | OUTPATIENT
Start: 2025-04-11

## 2025-04-11 RX ORDER — LEVOTHYROXINE SODIUM 125 UG/1
125 TABLET ORAL DAILY
Qty: 90 TABLET | Refills: 1 | Status: SHIPPED | OUTPATIENT
Start: 2025-04-11 | End: 2025-04-11

## 2025-04-11 RX ORDER — LEVOTHYROXINE SODIUM 125 UG/1
125 TABLET ORAL DAILY
Qty: 90 TABLET | Refills: 1 | Status: SHIPPED | OUTPATIENT
Start: 2025-04-11 | End: 2025-04-17 | Stop reason: SDUPTHER

## 2025-04-11 RX ORDER — PRAVASTATIN SODIUM 10 MG
10 TABLET ORAL DAILY
Qty: 90 TABLET | Refills: 1 | Status: SHIPPED | OUTPATIENT
Start: 2025-04-11 | End: 2025-04-22 | Stop reason: SDUPTHER

## 2025-04-11 NOTE — ASSESSMENT & PLAN NOTE
The 10-year ASCVD risk score (Richi WHITE, et al., 2019) is: 11.3%    Values used to calculate the score:      Age: 49 years      Sex: Female      Is Non- : No      Diabetic: No      Tobacco smoker: Yes      Systolic Blood Pressure: 133 mmHg      Is BP treated: Yes      HDL Cholesterol: 28 mg/dL      Total Cholesterol: 199 mg/dL    Counseled on lifestyle modifications including weight loss and low saturated fat diet.  Patient concerned about statin side effects, will start low-dose of pravastatin per patient's request    Orders:    pravastatin (PRAVACHOL) 10 mg tablet; Take 1 tablet (10 mg total) by mouth daily

## 2025-04-11 NOTE — PROGRESS NOTES
Name: Dionne Ford      : 1975      MRN: [id number]  Encounter Provider: Danny Vo MD  Encounter Date: 2025   Encounter department: [facility] CARE [address]  :  Assessment & Plan  Colon cancer screening    Orders:    Ambulatory Referral to Gastroenterology; Future    History of iron deficiency anemia    Orders:    Ambulatory Referral to Gastroenterology; Future    CBC and differential; Future    Fe+TIBC+Jacky; Future    Mixed hyperlipidemia  The 10-year ASCVD risk score (Richi WHITE, et al., 2019) is: 11.3%    Values used to calculate the score:      Age: 49 years      Sex: Female      Is Non- : No      Diabetic: No      Tobacco smoker: Yes      Systolic Blood Pressure: 133 mmHg      Is BP treated: Yes      HDL Cholesterol: 28 mg/dL      Total Cholesterol: 199 mg/dL    Counseled on lifestyle modifications including weight loss and low saturated fat diet.  Patient concerned about statin side effects, will start low-dose of pravastatin per patient's request    Orders:    pravastatin (PRAVACHOL) 10 mg tablet; Take 1 tablet (10 mg total) by mouth daily    Primary hypertension  Controlled, continue losartan 50 mg daily counseled lifestyle including low-sodium diet and weight loss  Orders:    losartan (COZAAR) 50 mg tablet; Take 1 tablet (50 mg total) by mouth daily    Post-surgical hypothyroidism    Orders:    levothyroxine (Synthroid) 125 mcg tablet; Take 1 tablet (125 mcg total) by mouth daily    TSH + Free T4; Future    Neck pain  Chronic  Orders:    Ambulatory Referral to Neurology; Future    Paresthesia    Orders:    Ambulatory Referral to Neurology; Future      The 10-year ASCVD risk score (Richi WHITE, et al., 2019) is: 11.2%    Values used to calculate the score:      Age: 49 years      Sex: Female      Is Non- : No      Diabetic: No      Tobacco smoker: Yes      Systolic Blood Pressure: 130 mmHg      Is BP treated:  "Yes      HDL Cholesterol: 30 mg/dL      Total Cholesterol: 218 mg/dL         History of Present Illness   HPI  Patient presents for follow-up  Review of Systems   Constitutional:  Negative for chills and fever.   Musculoskeletal:  Positive for neck pain.       Objective   /88   Pulse 96   Ht 5' 9\" (1.753 m)   Wt 120 kg (263 lb 9.6 oz)   SpO2 97%   BMI 38.93 kg/m²      Physical Exam  Constitutional:       General: She is not in acute distress.     Appearance: Normal appearance. She is obese.   Cardiovascular:      Rate and Rhythm: Normal rate and regular rhythm.   Pulmonary:      Effort: No respiratory distress.      Breath sounds: Normal breath sounds.   Neurological:      Mental Status: She is alert and oriented to person, place, and time.   Psychiatric:         Mood and Affect: Mood normal.         Behavior: Behavior normal.         "

## 2025-04-11 NOTE — TELEPHONE ENCOUNTER
Patients  called to inform the provider that she is unable to take levothyroxine (Synthroid) 125 mcg tablet and can only take the brand. Please send new prescription with brand name instead to [facility] #[id number] - [place] - [address] [address]

## 2025-04-11 NOTE — PROGRESS NOTES
04/11/25 10:10 AM    Patient was called after the Urgent Care visit Patient has an upcoming appointment with their primary care provider on 4/11/25 to follow on an elevated Blood pressure.      Thank you.  Trinh Estes  [facility]

## 2025-04-11 NOTE — ASSESSMENT & PLAN NOTE
Orders:    levothyroxine (Synthroid) 125 mcg tablet; Take 1 tablet (125 mcg total) by mouth daily    TSH + Free T4; Future

## 2025-04-12 LAB
ALBUMIN SERPL-MCNC: 4.1 G/DL (ref 3.9–4.9)
ALP SERPL-CCNC: 92 IU/L (ref 44–121)
ALT SERPL-CCNC: 13 IU/L (ref 0–32)
AST SERPL-CCNC: 15 IU/L (ref 0–40)
BILIRUB SERPL-MCNC: 0.2 MG/DL (ref 0–1.2)
BUN SERPL-MCNC: 12 MG/DL (ref 6–24)
BUN/CREAT SERPL: 13 (ref 9–23)
CALCIUM SERPL-MCNC: 9.1 MG/DL (ref 8.7–10.2)
CHLORIDE SERPL-SCNC: 103 MMOL/L (ref 96–106)
CHOLEST SERPL-MCNC: 199 MG/DL (ref 100–199)
CO2 SERPL-SCNC: 24 MMOL/L (ref 20–29)
CREAT SERPL-MCNC: 0.96 MG/DL (ref 0.57–1)
EGFR: 73 ML/MIN/1.73
GLOBULIN SER-MCNC: 2.5 G/DL (ref 1.5–4.5)
GLUCOSE SERPL-MCNC: 103 MG/DL (ref 70–99)
HDLC SERPL-MCNC: 28 MG/DL
LDLC SERPL CALC-MCNC: 105 MG/DL (ref 0–99)
LDLC/HDLC SERPL: 3.8 RATIO (ref 0–3.2)
POTASSIUM SERPL-SCNC: 4.8 MMOL/L (ref 3.5–5.2)
PROT SERPL-MCNC: 6.6 G/DL (ref 6–8.5)
SL AMB VLDL CHOLESTEROL CALC: 66 MG/DL (ref 5–40)
SODIUM SERPL-SCNC: 141 MMOL/L (ref 134–144)
TRIGL SERPL-MCNC: 383 MG/DL (ref 0–149)

## 2025-04-13 NOTE — PROGRESS NOTES
[facility]        NAME: Dionne Ford is a 49 y.o. female  : 1975    MRN: [id number]  DATE: 2025  TIME: 11:42 AM    Assessment and Plan   Rash [R21]  1. Rash  triamcinolone (KENALOG) 0.1 % cream      2. Neck swelling              Patient Instructions     Try triamcinolone cream to areas that are bothersome to you.    Follow-up with PCP in 3-5 days.    Go to the ED for any worsening symptoms.     If tests are performed, our office will contact you with results only if changes need to made to the care plan discussed with you at the visit. [facility]      Chief Complaint     Chief Complaint   Patient presents with    Edema     Patient started in January with neck swelling, she states that it comes and goes but is affecting her more. She has seen multiple doctors and no one can give her answers          History of Present Illness       49-year-old female presenting with posterior neck swelling and itchy burning skin that started yesterday. Patient states her skin feels swollen across the back of both shoulders and the uncomfortable sensation extends down into the back of both upper arms. Denies rash and erythema. Spouse feels like these symptoms are side effects of atorvastatin which patient recently started taking (ordered by PCP on 3/25). Alternately, patient states she's had this off and on since January. Denies swelling to face, lips, mouth, tongue, or throat. Denies difficulty breathing. Spouse states symptoms seemed to improve after giving patient leftover Prednisone 20 mg yesterday and today.         Review of Systems   Review of Systems   Constitutional:  Negative for fever.   Respiratory:  Negative for shortness of breath.    Cardiovascular:  Negative for chest pain.   Gastrointestinal:  Negative for abdominal pain.   Musculoskeletal:  Negative for joint swelling and neck pain.        Posterior neck / upper back swelling   Skin:   Negative for rash.        Burning/itchy sensation   Neurological:  Negative for dizziness and headaches.         Current Medications       Current Outpatient Medications:     triamcinolone (KENALOG) 0.1 % cream, Apply topically 2 (two) times a day, Disp: 45 g, Rfl: 0    cyclobenzaprine (FLEXERIL) 10 mg tablet, Take 10 mg by mouth daily at bedtime, Disp: , Rfl:     DULoxetine (CYMBALTA) 30 mg delayed release capsule, TAKE 1 CAPSULE (30 MG TOTAL) BY MOUTH DAILY FOR 7 DAYS THEN 2 CAPSULES (60 MG TOTAL) DAILY, Disp: 180 capsule, Rfl: 1    Ferrous Gluconate (IRON 27 PO), Take by mouth 2 (two) times a day, Disp: , Rfl:     levothyroxine (Synthroid) 125 mcg tablet, Take 1 tablet (125 mcg total) by mouth daily, Disp: 90 tablet, Rfl: 1    losartan (COZAAR) 50 mg tablet, Take 1 tablet (50 mg total) by mouth daily, Disp: 90 tablet, Rfl: 1    pravastatin (PRAVACHOL) 10 mg tablet, Take 1 tablet (10 mg total) by mouth daily, Disp: 90 tablet, Rfl: 1    propranolol (INDERAL) 10 mg tablet, Take 2 tablets (20 mg total) by mouth 4 (four) times a day, Disp: 720 tablet, Rfl: 1    Current Allergies     Allergies as of 2025 - Reviewed 2025   Allergen Reaction Noted    Bee venom Anaphylaxis 2019    Mirtazapine Anaphylaxis 2012    Hydrocodone-acetaminophen Vomiting 2012    Sertraline Rash 2025            The following portions of the patient's history were reviewed and updated as appropriate: allergies, current medications, past family history, past medical history, past social history, past surgical history and problem list.     Past Medical History:   Diagnosis Date    Anemia     Anxiety     Asthma     mild     Cancer (HCC) 2019    Thyroid    Disease of thyroid gland 2019    Removed    GERD (gastroesophageal reflux disease)     Ingrown toenail 2024    Painful    Palpitations        Past Surgical History:   Procedure Laterality Date    APPENDECTOMY      AUGMENTATION MAMMAPLASTY        SECTION      DENTAL SURGERY      THYROIDECTOMY Bilateral 11/8/2019    Procedure: TOTAL THYROIDECTOMY;  Surgeon: Greg Corona MD;  Location: [facility];  Service: Surgical Oncology    US GUIDED THYROID BIOPSY  10/8/2019       Family History   Problem Relation Age of Onset    Heart disease Mother     Hypertension Mother     Heart disease Paternal Grandfather          Medications have been verified.        Objective   /77   Pulse 99   Temp 97.9 °F (36.6 °C)   Resp 18   SpO2 96%        Physical Exam     Physical Exam  Vitals and nursing note reviewed.   Constitutional:       General: She is not in acute distress.     Appearance: She is not ill-appearing, toxic-appearing or diaphoretic.   HENT:      Head: Normocephalic and atraumatic.      Right Ear: External ear normal.      Left Ear: External ear normal.      Nose: Nose normal.      Mouth/Throat:      Mouth: Mucous membranes are moist.      Pharynx: Oropharynx is clear.   Eyes:      Conjunctiva/sclera: Conjunctivae normal.   Cardiovascular:      Rate and Rhythm: Normal rate and regular rhythm.      Pulses: Normal pulses.      Heart sounds: Normal heart sounds.   Pulmonary:      Effort: Pulmonary effort is normal.      Breath sounds: Normal breath sounds.   Musculoskeletal:         General: Normal range of motion.      Cervical back: Normal range of motion and neck supple. No pain with movement, spinous process tenderness or muscular tenderness.   Skin:     General: Skin is warm and dry.      Capillary Refill: Capillary refill takes less than 2 seconds.      Findings: No erythema or rash.   Neurological:      Mental Status: She is alert and oriented to person, place, and time.      Gait: Gait normal.   Psychiatric:         Mood and Affect: Mood normal.         Behavior: Behavior normal.

## 2025-04-13 NOTE — PATIENT INSTRUCTIONS
Try triamcinolone cream to areas that are bothersome to you.    Follow-up with PCP in 3-5 days.    Go to the ED for any worsening symptoms.

## 2025-04-16 ENCOUNTER — TELEPHONE (OUTPATIENT)
Dept: ENDOCRINOLOGY | Facility: CLINIC | Age: 50
End: 2025-04-16

## 2025-04-17 DIAGNOSIS — E89.0 POST-SURGICAL HYPOTHYROIDISM: ICD-10-CM

## 2025-04-17 DIAGNOSIS — M54.2 NECK PAIN: Primary | ICD-10-CM

## 2025-04-17 RX ORDER — CYCLOBENZAPRINE HCL 10 MG
10 TABLET ORAL
Qty: 30 TABLET | Refills: 0 | Status: SHIPPED | OUTPATIENT
Start: 2025-04-17

## 2025-04-17 NOTE — TELEPHONE ENCOUNTER
Patients  called the [facility]line again and needs someone to call the Pharmacy. He is stating he is getting messages from the pharmacy that the script is still wrong even though what was sent  over seems to have the directions correct with the patient only being able to take brand name.  Patient is almost out of medication   Patients  Donn would like a call at [phone] once this is taken care of

## 2025-04-18 ENCOUNTER — TELEPHONE (OUTPATIENT)
Dept: ENDOCRINOLOGY | Facility: CLINIC | Age: 50
End: 2025-04-18

## 2025-04-18 RX ORDER — LEVOTHYROXINE SODIUM 125 UG/1
125 TABLET ORAL DAILY
Qty: 90 TABLET | Refills: 0 | Status: SHIPPED | OUTPATIENT
Start: 2025-04-18

## 2025-04-22 ENCOUNTER — RESULTS FOLLOW-UP (OUTPATIENT)
Dept: FAMILY MEDICINE CLINIC | Facility: HOSPITAL | Age: 50
End: 2025-04-22

## 2025-04-22 DIAGNOSIS — E66.812 CLASS 2 OBESITY DUE TO EXCESS CALORIES WITHOUT SERIOUS COMORBIDITY WITH BODY MASS INDEX (BMI) OF 37.0 TO 37.9 IN ADULT: ICD-10-CM

## 2025-04-22 DIAGNOSIS — E66.09 CLASS 2 OBESITY DUE TO EXCESS CALORIES WITHOUT SERIOUS COMORBIDITY WITH BODY MASS INDEX (BMI) OF 37.0 TO 37.9 IN ADULT: ICD-10-CM

## 2025-04-22 DIAGNOSIS — E78.1 HYPERTRIGLYCERIDEMIA: ICD-10-CM

## 2025-04-22 DIAGNOSIS — E78.2 MIXED HYPERLIPIDEMIA: Primary | ICD-10-CM

## 2025-04-22 RX ORDER — ICOSAPENT ETHYL 1 G/1
2 CAPSULE ORAL 2 TIMES DAILY
Qty: 360 CAPSULE | Refills: 3 | Status: SHIPPED | OUTPATIENT
Start: 2025-04-22 | End: 2025-06-13

## 2025-04-22 RX ORDER — PRAVASTATIN SODIUM 20 MG
10 TABLET ORAL DAILY
Qty: 90 TABLET | Refills: 1 | Status: SHIPPED | OUTPATIENT
Start: 2025-04-22 | End: 2025-06-13

## 2025-04-22 NOTE — RESULT ENCOUNTER NOTE
Please inform patient of recent lab results.  Her slightly elevated glucose of 103 is nonconcerning.  I placed an order for A1c so we can screen her for diabetes next time she has routine lab work done.  Her cholesterol showed improvement of her LDL bad cholesterol, but still highly elevated triglycerides.  If she is tolerating the pravastatin 10 mg well, I recommend increasing this to 20 mg daily, new prescription placed.  I also recommend that she starts a prescription strength Omega 3 fish oil called Vascepa, prescription placed.  She should also focus on lifestyle modifications including weight loss and lowering her intake of saturated fats from sources like red meat and high fat dairy products.  Thanks

## 2025-04-24 ENCOUNTER — TELEPHONE (OUTPATIENT)
Dept: NEUROLOGY | Facility: CLINIC | Age: 50
End: 2025-04-24

## 2025-04-24 ENCOUNTER — TELEPHONE (OUTPATIENT)
Age: 50
End: 2025-04-24

## 2025-04-24 DIAGNOSIS — R21 RASH: Primary | ICD-10-CM

## 2025-04-24 RX ORDER — PREDNISONE 20 MG/1
20 TABLET ORAL DAILY
Qty: 5 TABLET | Refills: 0 | Status: SHIPPED | OUTPATIENT
Start: 2025-04-24 | End: 2025-04-29

## 2025-04-24 NOTE — TELEPHONE ENCOUNTER
"Patient and Spouse called rx refill line inquiring on if patient can be prescribed Prednisone for the rash that reoccurs when initially exposed to the sun. Reporting that patient has been seen many of times for this specific \"Sun\" rash, aware that Prednisone is the only treatment that offers relief.     Rash presents like poison ivy.   Itches, bright red in color, raised bumps among her arms and back.     Please send to [facility] pharmacy #[id number], Thank you.       "

## 2025-04-25 ENCOUNTER — TELEPHONE (OUTPATIENT)
Dept: FAMILY MEDICINE CLINIC | Facility: HOSPITAL | Age: 50
End: 2025-04-25

## 2025-04-25 NOTE — TELEPHONE ENCOUNTER
Lm that prednisone was sent to the pharmacy.  Also to let up know if she scheduled a Dermatology appointment.

## 2025-04-25 NOTE — TELEPHONE ENCOUNTER
PA for Icosapent Ethyl (Vascepa) 1 g CAPS SUBMITTED to [facility]    via    []CMM-KEY:   [x]Surescripts-Case ID # [id number]   []Availity-Auth ID # NDC #   []Faxed to plan   []Other website   []Phone call Case ID #     [x]PA sent as URGENT    All office notes, labs and other pertaining documents and studies sent. Clinical questions answered. Awaiting determination from insurance company.     Turnaround time for your insurance to make a decision on your Prior Authorization can take 7-21 business days.

## 2025-04-29 ENCOUNTER — CONSULT (OUTPATIENT)
Dept: NEUROLOGY | Facility: CLINIC | Age: 50
End: 2025-04-29
Payer: COMMERCIAL

## 2025-04-29 VITALS
HEART RATE: 81 BPM | DIASTOLIC BLOOD PRESSURE: 77 MMHG | WEIGHT: 261 LBS | HEIGHT: 69 IN | BODY MASS INDEX: 38.66 KG/M2 | SYSTOLIC BLOOD PRESSURE: 133 MMHG

## 2025-04-29 DIAGNOSIS — R20.2 PARESTHESIA: ICD-10-CM

## 2025-04-29 DIAGNOSIS — M54.2 NECK PAIN: Primary | ICD-10-CM

## 2025-04-29 DIAGNOSIS — G89.29 CHRONIC NONINTRACTABLE HEADACHE, UNSPECIFIED HEADACHE TYPE: ICD-10-CM

## 2025-04-29 DIAGNOSIS — R51.9 CHRONIC NONINTRACTABLE HEADACHE, UNSPECIFIED HEADACHE TYPE: ICD-10-CM

## 2025-04-29 DIAGNOSIS — M54.12 CERVICAL RADICULOPATHY: ICD-10-CM

## 2025-04-29 PROCEDURE — 99417 PROLNG OP E/M EACH 15 MIN: CPT | Performed by: STUDENT IN AN ORGANIZED HEALTH CARE EDUCATION/TRAINING PROGRAM

## 2025-04-29 PROCEDURE — 99245 OFF/OP CONSLTJ NEW/EST HI 55: CPT | Performed by: STUDENT IN AN ORGANIZED HEALTH CARE EDUCATION/TRAINING PROGRAM
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
## 2025-04-29 NOTE — PROGRESS NOTES
Review of Systems   Constitutional:  Negative for appetite change, fatigue and fever.   HENT: Negative.  Negative for hearing loss, tinnitus, trouble swallowing and voice change.    Eyes: Negative.  Negative for photophobia, pain and visual disturbance.   Respiratory: Negative.  Negative for shortness of breath.    Cardiovascular: Negative.  Negative for palpitations.   Gastrointestinal: Negative.  Negative for nausea and vomiting.   Endocrine: Negative.  Negative for cold intolerance.   Genitourinary: Negative.  Negative for dysuria, frequency and urgency.   Musculoskeletal:  Positive for neck pain. Negative for back pain, gait problem, myalgias and neck stiffness.   Skin: Negative.  Negative for rash.   Allergic/Immunologic: Negative.    Neurological:  Positive for headaches. Negative for dizziness, tremors, seizures, syncope, facial asymmetry, speech difficulty, weakness, light-headedness and numbness.   Hematological: Negative.  Does not bruise/bleed easily.   Psychiatric/Behavioral: Negative.  Negative for confusion, hallucinations and sleep disturbance.

## 2025-04-29 NOTE — TELEPHONE ENCOUNTER
PA for Icosapent Ethyl (Vascepa) 1 g CAPS DENIED    Reason:(Screenshot if applicable)        Message sent to office clinical pool Yes    Denial letter scanned into Media Yes    Appeal started No (Provider will need to decide if appeal is warranted and send clinical documentation to Prior Authorization Team for initiation.)    **Please follow up with your patient regarding denial and next steps**

## 2025-04-29 NOTE — PATIENT INSTRUCTIONS
- As we discussed, you have a myriad of different symptoms, which I agree, could be due to an issue with your neck  - At present, it is not yet clear whether we are dealing with a primary central nervous system issue (such as a pinched nerve/spinal cord in your neck) or something primarily musculoskeletal  - These new onset headaches also merit further evaluation, albeit certainly they could stem from an issue in your neck  - I have ordered an MRI of both your cervical spine (neck) as well as your brain  - I have also placed a referral to physical therapy for the neck discomfort, pain, and stiffness  - As we discussed today, we also need to consider something called a Functional Neurologic Disorder:              -This is a bodily manifestation of stress, anxiety, and/or trauma; it is NOT something that you are doing consciously              - However, it is worth noting that this is fairly low on my differential, and simply needs to be considered if all the other tests come back normal.

## 2025-04-30 ENCOUNTER — TELEPHONE (OUTPATIENT)
Age: 50
End: 2025-04-30

## 2025-05-30 ENCOUNTER — TELEPHONE (OUTPATIENT)
Age: 50
End: 2025-05-30

## 2025-05-30 NOTE — TELEPHONE ENCOUNTER
Patient's , Donn asked if a lipid panel order can be added to the patient's current active labs.  He stated that way Dr. Neville would get the whole thing instead of part of it.  Please advise.

## 2025-06-08 DIAGNOSIS — M54.2 NECK PAIN: ICD-10-CM

## 2025-06-09 RX ORDER — CYCLOBENZAPRINE HCL 10 MG
10 TABLET ORAL
Qty: 30 TABLET | Refills: 0 | Status: SHIPPED | OUTPATIENT
Start: 2025-06-09

## 2025-06-13 ENCOUNTER — OFFICE VISIT (OUTPATIENT)
Dept: FAMILY MEDICINE CLINIC | Facility: HOSPITAL | Age: 50
End: 2025-06-13
Payer: COMMERCIAL

## 2025-06-13 VITALS
HEIGHT: 69 IN | TEMPERATURE: 97.7 F | BODY MASS INDEX: 38.33 KG/M2 | DIASTOLIC BLOOD PRESSURE: 73 MMHG | SYSTOLIC BLOOD PRESSURE: 124 MMHG | WEIGHT: 258.8 LBS | HEART RATE: 87 BPM | OXYGEN SATURATION: 98 %

## 2025-06-13 DIAGNOSIS — D50.9 IRON DEFICIENCY ANEMIA, UNSPECIFIED IRON DEFICIENCY ANEMIA TYPE: ICD-10-CM

## 2025-06-13 DIAGNOSIS — E78.1 HYPERTRIGLYCERIDEMIA: ICD-10-CM

## 2025-06-13 DIAGNOSIS — I10 PRIMARY HYPERTENSION: ICD-10-CM

## 2025-06-13 DIAGNOSIS — E78.2 MIXED HYPERLIPIDEMIA: Primary | ICD-10-CM

## 2025-06-13 DIAGNOSIS — E78.5 DYSLIPIDEMIA: ICD-10-CM

## 2025-06-13 DIAGNOSIS — Z12.11 SCREENING FOR COLON CANCER: ICD-10-CM

## 2025-06-13 PROBLEM — R06.83 SNORING: Status: RESOLVED | Noted: 2019-12-29 | Resolved: 2025-06-13

## 2025-06-13 PROBLEM — R55 SYNCOPE: Status: RESOLVED | Noted: 2020-03-03 | Resolved: 2025-06-13

## 2025-06-13 PROCEDURE — 99214 OFFICE O/P EST MOD 30 MIN: CPT | Performed by: INTERNAL MEDICINE

## 2025-06-13 RX ORDER — MULTIVITAMIN
1 TABLET ORAL DAILY
COMMUNITY

## 2025-06-13 RX ORDER — MELATONIN 10 MG
10 CAPSULE ORAL
COMMUNITY

## 2025-06-13 NOTE — ASSESSMENT & PLAN NOTE
Pt UTT statin and states Vascepa was too expensive, Mediterranean diet and exercise and nutritionist was reviewed - pt states she is unable to follow that diet and she is refusing nutritionist, fibrate reviewed - pt wishing to see what cholesterol is now with diet and exercise, I advised her she just had it done and recommended she wait till Aug/Sept to recheck, may con't lifestyle changes btw now and then, 10 yr ASCVD risk score 11.3%, will follow  Orders:    Lipid panel

## 2025-06-13 NOTE — PROGRESS NOTES
Name: Dionne Ford      : 1975      MRN: [id number]  Encounter Provider: Lisseth Neville DO  Encounter Date: 2025   Encounter department: [facility] [address]   :  Assessment & Plan  Mixed hyperlipidemia  Pt UTT statin and states Vascepa was too expensive, Mediterranean diet and exercise and nutritionist was reviewed - pt states she is unable to follow that diet and she is refusing nutritionist, fibrate reviewed - pt wishing to see what cholesterol is now with diet and exercise, I advised her she just had it done and recommended she wait till Aug/Sept to recheck, may con't lifestyle changes btw now and then, 10 yr ASCVD risk score 11.3%, will follow  Orders:    Lipid panel    Hypertriglyceridemia  TG moderately elevated, see above, will follow  Orders:    Lipid panel    Primary hypertension  BP at goal, con't current meds, will follow  Orders:    CBC and differential    Comprehensive metabolic panel    Hemoglobin A1C    Lipid panel    Fe+TIBC+Jacky; Future    Dyslipidemia    Orders:    Lipid panel    Iron deficiency anemia, unspecified iron deficiency anemia type    Con't current iron supplement, recheck iron levels with labs in Aug, discussed finding source of the iron def - likely GYN related but is overdue for GI screenings - pt refusing colonoscopy - discussed this IS THE RECOMMENDED TEST WITH CONCURRENT IRON DEF but pt con't to refuse, discussed any screening is better then no screening and Cologuard ordered when pt agreed to that, discussed if Cologuard + would STILL NEED A COLONOSCOPY - will follow  Orders:    CBC and differential    Fe+TIBC+Jacky; Future    Screening for colon cancer    Orders:    Cologuard         Colonoscopy - never done - screening options were reviewed and pros and cons reviewed - pt agreeable to Cologuard    Mammo overdue and is scheduled    PAP - overdue - Dr. Garces with [facility]    BW       History of Present Illness   HPI Pt here for  "an acute visit    Pt here discuss cholesterol issues.  She was started on a statin but was not able to tolerate d/t SE.  She was given a rx for Vascepa but it was too expensive.  She is asking about diet/exercise.  She was advised to try a Mediterranean diet but states her family doesn't like that.      BP at goal today and meds were reviewed and no changes have occurred.  She denies missing doses of meds or SE with the meds.  She does intermittently check her BP outside the office and states BP is pretty well controlled at home.  She notes no frequent Ha's/dizziness/double vision/CP.      She has h/o iron def. She feels this is d/t heavy menses with clots. She has never had colon cancer screening. She is taking PO iron once a day  She has a f/u with GYN and has pelvic US.        Review of Systems   Constitutional:  Negative for chills and fever.   HENT:  Negative for congestion and sore throat.    Eyes:  Negative for pain and visual disturbance.   Respiratory:  Negative for cough, shortness of breath and wheezing.    Cardiovascular:  Negative for chest pain and palpitations.   Gastrointestinal:  Negative for abdominal pain, blood in stool, constipation, diarrhea, nausea and vomiting.   Genitourinary:  Positive for menstrual problem. Negative for difficulty urinating and dysuria.   Musculoskeletal:  Positive for neck pain. Negative for gait problem.   Skin:  Negative for rash and wound.   Neurological:  Positive for headaches. Negative for dizziness.   Hematological:  Does not bruise/bleed easily.   Psychiatric/Behavioral:  Negative for behavioral problems.        Objective   /73 (BP Location: Left arm, Patient Position: Sitting, Cuff Size: Large)   Pulse 87   Temp 97.7 °F (36.5 °C)   Ht 5' 9\" (1.753 m)   Wt 117 kg (258 lb 12.8 oz)   SpO2 98%   BMI 38.22 kg/m²      Physical Exam  Vitals and nursing note reviewed.   Constitutional:       General: She is not in acute distress.     Appearance: She is " well-developed. She is not ill-appearing.   HENT:      Head: Normocephalic and atraumatic.      Right Ear: External ear normal.      Left Ear: External ear normal.     Eyes:      General:         Right eye: No discharge.         Left eye: No discharge.      Conjunctiva/sclera: Conjunctivae normal.     Neck:      Thyroid: No thyromegaly.      Trachea: No tracheal deviation.     Cardiovascular:      Rate and Rhythm: Normal rate and regular rhythm.      Heart sounds: Normal heart sounds. No murmur heard.  Pulmonary:      Effort: Pulmonary effort is normal. No respiratory distress.      Breath sounds: Normal breath sounds. No wheezing, rhonchi or rales.   Abdominal:      General: There is no distension.      Palpations: Abdomen is soft.      Tenderness: There is no abdominal tenderness. There is no guarding or rebound.     Musculoskeletal:         General: No signs of injury.      Cervical back: Neck supple.      Right lower leg: No edema.      Left lower leg: No edema.     Skin:     General: Skin is warm and dry.      Coloration: Skin is not pale.      Findings: No rash.     Neurological:      General: No focal deficit present.      Mental Status: She is alert.      Motor: No abnormal muscle tone.      Gait: Gait normal.     Psychiatric:         Thought Content: Thought content normal.

## 2025-06-13 NOTE — ASSESSMENT & PLAN NOTE
BP at goal, con't current meds, will follow  Orders:    CBC and differential    Comprehensive metabolic panel    Hemoglobin A1C    Lipid panel    Fe+TIBC+Jacky; Future

## 2025-06-24 LAB — COLOGUARD RESULT REPORTABLE: NEGATIVE

## 2025-07-11 DIAGNOSIS — M54.2 NECK PAIN: ICD-10-CM

## 2025-07-14 RX ORDER — CYCLOBENZAPRINE HCL 10 MG
10 TABLET ORAL
Qty: 30 TABLET | Refills: 0 | Status: SHIPPED | OUTPATIENT
Start: 2025-07-14

## 2025-07-30 ENCOUNTER — TELEPHONE (OUTPATIENT)
Dept: NEUROLOGY | Facility: CLINIC | Age: 50
End: 2025-07-30

## 2025-08-16 DIAGNOSIS — M54.2 NECK PAIN: ICD-10-CM

## 2025-08-18 RX ORDER — CYCLOBENZAPRINE HCL 10 MG
10 TABLET ORAL
Qty: 30 TABLET | Refills: 0 | Status: SHIPPED | OUTPATIENT
Start: 2025-08-18